# Patient Record
Sex: MALE | Race: WHITE | NOT HISPANIC OR LATINO
[De-identification: names, ages, dates, MRNs, and addresses within clinical notes are randomized per-mention and may not be internally consistent; named-entity substitution may affect disease eponyms.]

---

## 2017-02-14 ENCOUNTER — MEDICATION RENEWAL (OUTPATIENT)
Age: 67
End: 2017-02-14

## 2017-02-20 ENCOUNTER — MEDICATION RENEWAL (OUTPATIENT)
Age: 67
End: 2017-02-20

## 2017-03-28 ENCOUNTER — NON-APPOINTMENT (OUTPATIENT)
Age: 67
End: 2017-03-28

## 2017-03-28 ENCOUNTER — APPOINTMENT (OUTPATIENT)
Dept: CARDIOLOGY | Facility: CLINIC | Age: 67
End: 2017-03-28

## 2017-03-28 VITALS — HEART RATE: 61 BPM | DIASTOLIC BLOOD PRESSURE: 77 MMHG | OXYGEN SATURATION: 98 % | SYSTOLIC BLOOD PRESSURE: 127 MMHG

## 2017-03-28 DIAGNOSIS — I25.119 ATHEROSCLEROTIC HEART DISEASE OF NATIVE CORONARY ARTERY WITH UNSPECIFIED ANGINA PECTORIS: ICD-10-CM

## 2017-04-13 LAB
BASOPHILS # BLD AUTO: 0.02 K/UL
BASOPHILS NFR BLD AUTO: 0.4 %
EOSINOPHIL # BLD AUTO: 0.08 K/UL
EOSINOPHIL NFR BLD AUTO: 1.5 %
HCT VFR BLD CALC: 37.4 %
HGB BLD-MCNC: 13.1 G/DL
IMM GRANULOCYTES NFR BLD AUTO: 0 %
LYMPHOCYTES # BLD AUTO: 1.6 K/UL
LYMPHOCYTES NFR BLD AUTO: 30.5 %
MAN DIFF?: NORMAL
MCHC RBC-ENTMCNC: 32.9 PG
MCHC RBC-ENTMCNC: 35 GM/DL
MCV RBC AUTO: 94 FL
MONOCYTES # BLD AUTO: 0.48 K/UL
MONOCYTES NFR BLD AUTO: 9.1 %
NEUTROPHILS # BLD AUTO: 3.07 K/UL
NEUTROPHILS NFR BLD AUTO: 58.5 %
PLATELET # BLD AUTO: 142 K/UL
RBC # BLD: 3.98 M/UL
RBC # FLD: 13.3 %
WBC # FLD AUTO: 5.25 K/UL

## 2017-04-14 LAB
ALBUMIN SERPL ELPH-MCNC: 4 G/DL
ALP BLD-CCNC: 40 U/L
ALT SERPL-CCNC: 23 U/L
ANION GAP SERPL CALC-SCNC: 13 MMOL/L
AST SERPL-CCNC: 32 U/L
BILIRUB SERPL-MCNC: 2.8 MG/DL
BUN SERPL-MCNC: 23 MG/DL
CALCIUM SERPL-MCNC: 9.4 MG/DL
CHLORIDE SERPL-SCNC: 105 MMOL/L
CHOLEST SERPL-MCNC: 119 MG/DL
CHOLEST/HDLC SERPL: 2.3 RATIO
CO2 SERPL-SCNC: 24 MMOL/L
CREAT SERPL-MCNC: 0.92 MG/DL
GLUCOSE SERPL-MCNC: 102 MG/DL
HDLC SERPL-MCNC: 51 MG/DL
LDLC SERPL CALC-MCNC: 58 MG/DL
POTASSIUM SERPL-SCNC: 4.5 MMOL/L
PROT SERPL-MCNC: 6.4 G/DL
SODIUM SERPL-SCNC: 142 MMOL/L
TRIGL SERPL-MCNC: 50 MG/DL

## 2018-01-15 ENCOUNTER — APPOINTMENT (OUTPATIENT)
Dept: CARDIOLOGY | Facility: CLINIC | Age: 68
End: 2018-01-15
Payer: MEDICARE

## 2018-01-15 PROCEDURE — 93306 TTE W/DOPPLER COMPLETE: CPT

## 2018-02-20 ENCOUNTER — MEDICATION RENEWAL (OUTPATIENT)
Age: 68
End: 2018-02-20

## 2018-02-20 ENCOUNTER — APPOINTMENT (OUTPATIENT)
Dept: CARDIOLOGY | Facility: CLINIC | Age: 68
End: 2018-02-20
Payer: MEDICARE

## 2018-02-20 PROCEDURE — 93015 CV STRESS TEST SUPVJ I&R: CPT

## 2018-02-26 ENCOUNTER — APPOINTMENT (OUTPATIENT)
Dept: CARDIOLOGY | Facility: CLINIC | Age: 68
End: 2018-02-26
Payer: MEDICARE

## 2018-02-26 ENCOUNTER — NON-APPOINTMENT (OUTPATIENT)
Age: 68
End: 2018-02-26

## 2018-02-26 VITALS
HEIGHT: 68 IN | SYSTOLIC BLOOD PRESSURE: 132 MMHG | OXYGEN SATURATION: 99 % | HEART RATE: 57 BPM | BODY MASS INDEX: 25.31 KG/M2 | WEIGHT: 167 LBS | DIASTOLIC BLOOD PRESSURE: 82 MMHG

## 2018-02-26 PROCEDURE — 93000 ELECTROCARDIOGRAM COMPLETE: CPT

## 2018-02-26 PROCEDURE — 99215 OFFICE O/P EST HI 40 MIN: CPT

## 2018-02-27 LAB
ALBUMIN SERPL ELPH-MCNC: 4.3 G/DL
ALP BLD-CCNC: 43 U/L
ALT SERPL-CCNC: 20 U/L
ANION GAP SERPL CALC-SCNC: 15 MMOL/L
AST SERPL-CCNC: 28 U/L
BASOPHILS # BLD AUTO: 0.04 K/UL
BASOPHILS NFR BLD AUTO: 0.6 %
BILIRUB SERPL-MCNC: 2.9 MG/DL
BUN SERPL-MCNC: 19 MG/DL
CALCIUM SERPL-MCNC: 9.8 MG/DL
CHLORIDE SERPL-SCNC: 103 MMOL/L
CHOLEST SERPL-MCNC: 133 MG/DL
CHOLEST/HDLC SERPL: 2.3 RATIO
CO2 SERPL-SCNC: 24 MMOL/L
CREAT SERPL-MCNC: 1.06 MG/DL
EOSINOPHIL # BLD AUTO: 0.07 K/UL
EOSINOPHIL NFR BLD AUTO: 1.1 %
GLUCOSE SERPL-MCNC: 96 MG/DL
HCT VFR BLD CALC: 41 %
HDLC SERPL-MCNC: 59 MG/DL
HGB BLD-MCNC: 14.1 G/DL
IMM GRANULOCYTES NFR BLD AUTO: 0.2 %
LDLC SERPL CALC-MCNC: 63 MG/DL
LYMPHOCYTES # BLD AUTO: 1.98 K/UL
LYMPHOCYTES NFR BLD AUTO: 30.7 %
MAN DIFF?: NORMAL
MCHC RBC-ENTMCNC: 32.6 PG
MCHC RBC-ENTMCNC: 34.4 GM/DL
MCV RBC AUTO: 94.9 FL
MONOCYTES # BLD AUTO: 0.55 K/UL
MONOCYTES NFR BLD AUTO: 8.5 %
NEUTROPHILS # BLD AUTO: 3.79 K/UL
NEUTROPHILS NFR BLD AUTO: 58.9 %
PLATELET # BLD AUTO: 184 K/UL
POTASSIUM SERPL-SCNC: 4.7 MMOL/L
PROT SERPL-MCNC: 7.1 G/DL
RBC # BLD: 4.32 M/UL
RBC # FLD: 14.1 %
SODIUM SERPL-SCNC: 142 MMOL/L
TRIGL SERPL-MCNC: 55 MG/DL
WBC # FLD AUTO: 6.44 K/UL

## 2018-03-21 ENCOUNTER — RX RENEWAL (OUTPATIENT)
Age: 68
End: 2018-03-21

## 2018-04-30 ENCOUNTER — RX RENEWAL (OUTPATIENT)
Age: 68
End: 2018-04-30

## 2018-08-14 ENCOUNTER — MEDICATION RENEWAL (OUTPATIENT)
Age: 68
End: 2018-08-14

## 2019-01-22 ENCOUNTER — NON-APPOINTMENT (OUTPATIENT)
Age: 69
End: 2019-01-22

## 2019-01-22 ENCOUNTER — APPOINTMENT (OUTPATIENT)
Dept: CARDIOLOGY | Facility: CLINIC | Age: 69
End: 2019-01-22
Payer: MEDICARE

## 2019-01-22 VITALS
SYSTOLIC BLOOD PRESSURE: 116 MMHG | OXYGEN SATURATION: 100 % | WEIGHT: 162 LBS | HEIGHT: 68 IN | HEART RATE: 65 BPM | BODY MASS INDEX: 24.55 KG/M2 | DIASTOLIC BLOOD PRESSURE: 71 MMHG

## 2019-01-22 PROCEDURE — 93000 ELECTROCARDIOGRAM COMPLETE: CPT

## 2019-01-22 PROCEDURE — 99215 OFFICE O/P EST HI 40 MIN: CPT

## 2019-01-22 NOTE — HISTORY OF PRESENT ILLNESS
[FreeTextEntry1] : Trent Gage presented to the office for a follow-up cardiovascular evaluation.  He was last seen in the office 1 year ago.\par \par He is now 68 years old with a long history of hypertension, without a history of diabetes and hypercholesterolemia. He presented in March, 2012 with intermittent discomfort in his left shoulder, which did not sound ischemic, noting that it was unrelated to physical activity or emotional stress.  For this, he had nuclear stress testing, which was abnormal. He was referred for a coronary CT, which he had 4/11/2012.  This revealed double vessel coronary artery disease, with hemodynamically significant disease likely.  He was felt  to have a  ~60 stenosis of the proximal LAD and a ~70% stenosis of the circumflex. We discussed the fact that he had a relatively low risk stress test without any symptoms, and that coronary angiography was optional but not required.  \par \par  Eventually, he reported symptoms which could have been considered anginal, and catheterization was performed.  This revealed 70% stenosis of the mid LAD, with generally small distal vessels, and significant branch disease. Medical therapy was felt to be appropriate.\par \par When Trent was last seen in the office he was feeling well.  Stress testing was performed which was abnormal in terms of electrocardiographic changes, but he had no symptoms with a heart rate in the 120s.\par \par Fortunately, he continues to feel well.  He reports no chest discomfort that would be suggestive of angina.  He reports no orthopnea, PND or lower extremity edema. He denies palpitations, as well as dizziness and syncope.  He has been more active, with an exercise bicycle and a weight training routine.   He has not been checking his blood pressure. His cholesterol was last checked about a year ago. He is planned for cataract surgery.

## 2019-01-22 NOTE — CARDIOLOGY SUMMARY
[No Symptoms] : no Symptoms [LVEF ___%] : LVEF [unfilled]% [None] : no pulmonary hypertension [Normal] : normal LA size [Mild] : mild mitral regurgitation [___] : [unfilled]

## 2019-01-22 NOTE — PHYSICAL EXAM
[General Appearance - Well Developed] : well developed [Normal Appearance] : normal appearance [Well Groomed] : well groomed [General Appearance - Well Nourished] : well nourished [No Deformities] : no deformities [General Appearance - In No Acute Distress] : no acute distress [Normal Conjunctiva] : the conjunctiva exhibited no abnormalities [Eyelids - No Xanthelasma] : the eyelids demonstrated no xanthelasmas [Normal Oral Mucosa] : normal oral mucosa [No Oral Pallor] : no oral pallor [No Oral Cyanosis] : no oral cyanosis [Normal Jugular Venous A Waves Present] : normal jugular venous A waves present [Normal Jugular Venous V Waves Present] : normal jugular venous V waves present [No Jugular Venous Cosme A Waves] : no jugular venous cosme A waves [Respiration, Rhythm And Depth] : normal respiratory rhythm and effort [Exaggerated Use Of Accessory Muscles For Inspiration] : no accessory muscle use [Auscultation Breath Sounds / Voice Sounds] : lungs were clear to auscultation bilaterally [Abdomen Soft] : soft [Abdomen Tenderness] : non-tender [Abdomen Mass (___ Cm)] : no abdominal mass palpated [Abnormal Walk] : normal gait [Gait - Sufficient For Exercise Testing] : the gait was sufficient for exercise testing [Nail Clubbing] : no clubbing of the fingernails [Cyanosis, Localized] : no localized cyanosis [Petechial Hemorrhages (___cm)] : no petechial hemorrhages [Skin Color & Pigmentation] : normal skin color and pigmentation [] : no rash [No Venous Stasis] : no venous stasis [Skin Lesions] : no skin lesions [No Skin Ulcers] : no skin ulcer [No Xanthoma] : no  xanthoma was observed [Oriented To Time, Place, And Person] : oriented to person, place, and time [Affect] : the affect was normal [Mood] : the mood was normal [No Anxiety] : not feeling anxious [5th Left ICS - MCL] : palpated at the 5th LICS in the midclavicular line [Normal] : normal [No Precordial Heave] : no precordial heave was noted [Apical Thrill] : no thrill palpable at the apex [Normal Rate] : normal [Rhythm Regular] : regular [Normal S1] : normal S1 [Normal S2] : normal S2 [No Gallop] : no gallop heard [S3] : no S3 [S4] : no S4 [Click] : no click [Pericardial Rub] : no pericardial rub [No Murmur] : no murmurs heard [Right Carotid Bruit] : no bruit heard over the right carotid [Left Carotid Bruit] : no bruit heard over the left carotid [Right Femoral Bruit] : no bruit heard over the right femoral artery [Left Femoral Bruit] : no bruit heard over the left femoral artery [2+] : left 2+ [Bruit] : no bruit heard [No Pitting Edema] : no pitting edema present [Rt] : no varicose veins of the right leg [Lt] : no varicose veins of the left leg

## 2019-01-22 NOTE — REASON FOR VISIT
[Follow-Up - Clinic] : a clinic follow-up of [Abnormal Test Result] : an abnormal test result [Coronary Artery Disease] : coronary artery disease [Hyperlipidemia] : hyperlipidemia [Hypertension] : hypertension

## 2019-01-23 LAB
ALBUMIN SERPL ELPH-MCNC: 4 G/DL
ALP BLD-CCNC: 49 U/L
ALT SERPL-CCNC: 42 U/L
ANION GAP SERPL CALC-SCNC: 6 MMOL/L
APPEARANCE: CLEAR
AST SERPL-CCNC: 45 U/L
BACTERIA: NEGATIVE
BASOPHILS # BLD AUTO: 0.03 K/UL
BASOPHILS NFR BLD AUTO: 0.6 %
BILIRUB SERPL-MCNC: 3.2 MG/DL
BILIRUBIN URINE: NEGATIVE
BLOOD URINE: NEGATIVE
BUN SERPL-MCNC: 21 MG/DL
CALCIUM SERPL-MCNC: 9.6 MG/DL
CHLORIDE SERPL-SCNC: 104 MMOL/L
CHOLEST SERPL-MCNC: 111 MG/DL
CHOLEST/HDLC SERPL: 2.7 RATIO
CO2 SERPL-SCNC: 28 MMOL/L
COLOR: ABNORMAL
CREAT SERPL-MCNC: 1.02 MG/DL
EOSINOPHIL # BLD AUTO: 0.13 K/UL
EOSINOPHIL NFR BLD AUTO: 2.6 %
GLUCOSE QUALITATIVE U: NEGATIVE MG/DL
GLUCOSE SERPL-MCNC: 94 MG/DL
HBA1C MFR BLD HPLC: 5 %
HCT VFR BLD CALC: 37.8 %
HDLC SERPL-MCNC: 41 MG/DL
HGB BLD-MCNC: 12.6 G/DL
HYALINE CASTS: 2 /LPF
IMM GRANULOCYTES NFR BLD AUTO: 0.2 %
KETONES URINE: ABNORMAL
LDLC SERPL CALC-MCNC: 59 MG/DL
LEUKOCYTE ESTERASE URINE: NEGATIVE
LYMPHOCYTES # BLD AUTO: 1.37 K/UL
LYMPHOCYTES NFR BLD AUTO: 26.9 %
MAN DIFF?: NORMAL
MCHC RBC-ENTMCNC: 31.8 PG
MCHC RBC-ENTMCNC: 33.3 GM/DL
MCV RBC AUTO: 95.5 FL
MICROSCOPIC-UA: NORMAL
MONOCYTES # BLD AUTO: 0.59 K/UL
MONOCYTES NFR BLD AUTO: 11.6 %
NEUTROPHILS # BLD AUTO: 2.96 K/UL
NEUTROPHILS NFR BLD AUTO: 58.1 %
NITRITE URINE: NEGATIVE
PH URINE: 6.5
PLATELET # BLD AUTO: 179 K/UL
POTASSIUM SERPL-SCNC: 4.6 MMOL/L
PROT SERPL-MCNC: 6.6 G/DL
PROTEIN URINE: NEGATIVE MG/DL
PSA SERPL-MCNC: 2.08 NG/ML
RBC # BLD: 3.96 M/UL
RBC # FLD: 14.3 %
RED BLOOD CELLS URINE: 1 /HPF
SODIUM SERPL-SCNC: 138 MMOL/L
SPECIFIC GRAVITY URINE: 1.03
SQUAMOUS EPITHELIAL CELLS: 1 /HPF
TRIGL SERPL-MCNC: 55 MG/DL
TSH SERPL-ACNC: 0.73 UIU/ML
UROBILINOGEN URINE: 1 MG/DL
WBC # FLD AUTO: 5.09 K/UL
WHITE BLOOD CELLS URINE: 0 /HPF

## 2019-03-14 ENCOUNTER — APPOINTMENT (OUTPATIENT)
Dept: CARDIOLOGY | Facility: CLINIC | Age: 69
End: 2019-03-14
Payer: MEDICARE

## 2019-03-14 PROCEDURE — 93880 EXTRACRANIAL BILAT STUDY: CPT

## 2019-03-25 ENCOUNTER — RX RENEWAL (OUTPATIENT)
Age: 69
End: 2019-03-25

## 2019-03-27 ENCOUNTER — APPOINTMENT (OUTPATIENT)
Dept: CARDIOLOGY | Facility: CLINIC | Age: 69
End: 2019-03-27
Payer: MEDICARE

## 2019-03-27 PROCEDURE — 93015 CV STRESS TEST SUPVJ I&R: CPT

## 2019-04-10 ENCOUNTER — NON-APPOINTMENT (OUTPATIENT)
Age: 69
End: 2019-04-10

## 2019-04-10 ENCOUNTER — APPOINTMENT (OUTPATIENT)
Dept: CARDIOLOGY | Facility: CLINIC | Age: 69
End: 2019-04-10
Payer: MEDICARE

## 2019-04-10 VITALS
BODY MASS INDEX: 25.01 KG/M2 | HEART RATE: 61 BPM | HEIGHT: 68 IN | WEIGHT: 165 LBS | OXYGEN SATURATION: 97 % | SYSTOLIC BLOOD PRESSURE: 147 MMHG | DIASTOLIC BLOOD PRESSURE: 79 MMHG

## 2019-04-10 VITALS — SYSTOLIC BLOOD PRESSURE: 135 MMHG | DIASTOLIC BLOOD PRESSURE: 80 MMHG

## 2019-04-10 PROCEDURE — 99215 OFFICE O/P EST HI 40 MIN: CPT

## 2019-04-10 PROCEDURE — 93000 ELECTROCARDIOGRAM COMPLETE: CPT

## 2019-04-10 NOTE — HISTORY OF PRESENT ILLNESS
[FreeTextEntry1] : Trent Gage presented to the office for a follow-up cardiovascular evaluation.  He was last seen in the office in January.\par \par He is now 68 years old with a long history of hypertension, without a history of diabetes and hypercholesterolemia. He presented in March, 2012 with intermittent discomfort in his left shoulder, which did not sound ischemic, noting that it was unrelated to physical activity or emotional stress.  For this, he had nuclear stress testing, which was abnormal. He was referred for a coronary CT, which he had 4/11/2012.  This revealed double vessel coronary artery disease, with hemodynamically significant disease likely.  He was felt  to have a  ~60 stenosis of the proximal LAD and a ~70% stenosis of the circumflex. We discussed the fact that he had a relatively low risk stress test without any symptoms, and that coronary angiography was optional but not required.  \par \par  Eventually, he reported symptoms which could have been considered anginal, and catheterization was performed.  This revealed 70% stenosis of the mid LAD, with generally small distal vessels, and significant branch disease. Medical therapy was felt to be appropriate.\par \par At the time of the last visit, he was feeling well. I recommended that we repeat his treadmill stress test to reevaluate his EKG changes and overall response to exercise.\par \par His examination was recently performed. His exercise capacity was reduced relative to last year, and he reported chest discomfort which he did not really report last year. The magnitude of his EKG changes and was essentially unchanged.\par \par He continues to report that he has no angina in his day-to-day activities. He denies any shortness of breath with activity. He denies orthopnea, PND and lower extremity edema. He denies palpitations, dizziness and syncope. He does not check his blood pressure very frequently, but when he does check it, he has found that it has been more elevated.

## 2019-04-10 NOTE — PHYSICAL EXAM
[General Appearance - Well Developed] : well developed [Normal Appearance] : normal appearance [Well Groomed] : well groomed [General Appearance - Well Nourished] : well nourished [No Deformities] : no deformities [General Appearance - In No Acute Distress] : no acute distress [Normal Conjunctiva] : the conjunctiva exhibited no abnormalities [Eyelids - No Xanthelasma] : the eyelids demonstrated no xanthelasmas [Normal Oral Mucosa] : normal oral mucosa [No Oral Pallor] : no oral pallor [No Oral Cyanosis] : no oral cyanosis [Normal Jugular Venous A Waves Present] : normal jugular venous A waves present [Normal Jugular Venous V Waves Present] : normal jugular venous V waves present [No Jugular Venous Cosme A Waves] : no jugular venous cosme A waves [Respiration, Rhythm And Depth] : normal respiratory rhythm and effort [Exaggerated Use Of Accessory Muscles For Inspiration] : no accessory muscle use [Auscultation Breath Sounds / Voice Sounds] : lungs were clear to auscultation bilaterally [Abdomen Soft] : soft [Abdomen Tenderness] : non-tender [Abdomen Mass (___ Cm)] : no abdominal mass palpated [Abnormal Walk] : normal gait [Gait - Sufficient For Exercise Testing] : the gait was sufficient for exercise testing [Nail Clubbing] : no clubbing of the fingernails [Cyanosis, Localized] : no localized cyanosis [Petechial Hemorrhages (___cm)] : no petechial hemorrhages [Skin Color & Pigmentation] : normal skin color and pigmentation [] : no rash [No Venous Stasis] : no venous stasis [Skin Lesions] : no skin lesions [No Skin Ulcers] : no skin ulcer [Oriented To Time, Place, And Person] : oriented to person, place, and time [No Xanthoma] : no  xanthoma was observed [No Anxiety] : not feeling anxious [Mood] : the mood was normal [Affect] : the affect was normal [Normal] : normal [5th Left ICS - MCL] : palpated at the 5th LICS in the midclavicular line [Apical Thrill] : no thrill palpable at the apex [No Precordial Heave] : no precordial heave was noted [Normal Rate] : normal [Rhythm Regular] : regular [Normal S1] : normal S1 [Normal S2] : normal S2 [No Gallop] : no gallop heard [S3] : no S3 [S4] : no S4 [Click] : no click [No Murmur] : no murmurs heard [Pericardial Rub] : no pericardial rub [Right Carotid Bruit] : no bruit heard over the right carotid [Left Carotid Bruit] : no bruit heard over the left carotid [Left Femoral Bruit] : no bruit heard over the left femoral artery [Right Femoral Bruit] : no bruit heard over the right femoral artery [2+] : right 2+ [Bruit] : no bruit heard [No Pitting Edema] : no pitting edema present [Rt] : no varicose veins of the right leg [Lt] : no varicose veins of the left leg

## 2019-04-10 NOTE — DISCUSSION/SUMMARY
[FreeTextEntry1] : Trent remains physically active, and has no symptoms which could be considered angina.  He does have CAD however.  The worst of his disease is branch disease not readily amenable to PCI, based on cath from 2013.  \par \par I am certainly concerned about the fact that he had more symptoms at a lower heart rate on his most recent stress test. In the absence of angina, and if left ventricular function is intact, I think he remains best served with medical therapy alone. He'll have another echocardiogram to reevaluate his LV function in this context. I will be in contact with him to discuss the results.\par \par He is planned for cataract surgery. He may proceed without further cardiac testing. Routine hemodynamic monitoring is recommended.\par \par His blood pressure has been a bit more elevated, but he does not check it much. He will check it more frequently over the next month, and call me if it is elevated.

## 2019-04-29 ENCOUNTER — RX RENEWAL (OUTPATIENT)
Age: 69
End: 2019-04-29

## 2019-08-19 ENCOUNTER — RX RENEWAL (OUTPATIENT)
Age: 69
End: 2019-08-19

## 2019-10-31 ENCOUNTER — APPOINTMENT (OUTPATIENT)
Dept: CARDIOLOGY | Facility: CLINIC | Age: 69
End: 2019-10-31
Payer: MEDICARE

## 2019-10-31 ENCOUNTER — NON-APPOINTMENT (OUTPATIENT)
Age: 69
End: 2019-10-31

## 2019-10-31 VITALS
OXYGEN SATURATION: 99 % | BODY MASS INDEX: 24.86 KG/M2 | HEIGHT: 68 IN | HEART RATE: 70 BPM | DIASTOLIC BLOOD PRESSURE: 74 MMHG | SYSTOLIC BLOOD PRESSURE: 120 MMHG | WEIGHT: 164 LBS

## 2019-10-31 PROCEDURE — 99214 OFFICE O/P EST MOD 30 MIN: CPT

## 2019-10-31 PROCEDURE — 93000 ELECTROCARDIOGRAM COMPLETE: CPT

## 2019-10-31 NOTE — HISTORY OF PRESENT ILLNESS
[FreeTextEntry1] : Trent Gage presented to the office for a follow-up cardiovascular evaluation.  He was last seen in the office in April.\par \par He is now 68 years old with a long history of hypertension, without a history of diabetes and hypercholesterolemia. He presented in March, 2012 with intermittent discomfort in his left shoulder, which did not sound ischemic, noting that it was unrelated to physical activity or emotional stress.  For this, he had nuclear stress testing, which was abnormal. He was referred for a coronary CT, which he had 4/11/2012.  This revealed double vessel coronary artery disease, with hemodynamically significant disease likely.  He was felt  to have a  ~60 stenosis of the proximal LAD and a ~70% stenosis of the circumflex. We discussed the fact that he had a relatively low risk stress test without any symptoms, and that coronary angiography was optional but not required.  \par \par  Eventually, he reported symptoms which could have been considered anginal, and catheterization was performed.  This revealed 70% stenosis of the mid LAD, with generally small distal vessels, and significant branch disease. 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 He denies any shortness of breath with activity. He denies orthopnea, PND and lower extremity edema. He denies palpitations, dizziness and syncope. He does not check his blood pressure very frequently, but when he does check it, he has found that it has been normal.

## 2019-10-31 NOTE — DISCUSSION/SUMMARY
[FreeTextEntry1] : Trent remains physically active, and has no symptoms which could be considered angina.  He does have CAD however.  The worst of his disease is branch disease not readily amenable to PCI, based on cath from 2013.  \par \par I think he remains best served with medical therapy alone. He will have labs done today. He will continue his regular physical activities. If he develops any symptoms, he will call me. Otherwise, he will see me in 6 months. I would consider another echocardiogram and stress test at that time.

## 2019-11-04 LAB
ALBUMIN SERPL ELPH-MCNC: 4.2 G/DL
ALP BLD-CCNC: 47 U/L
ALT SERPL-CCNC: 20 U/L
ANION GAP SERPL CALC-SCNC: 10 MMOL/L
AST SERPL-CCNC: 27 U/L
BASOPHILS # BLD AUTO: 0.04 K/UL
BASOPHILS NFR BLD AUTO: 0.7 %
BILIRUB SERPL-MCNC: 2.5 MG/DL
BUN SERPL-MCNC: 17 MG/DL
CALCIUM SERPL-MCNC: 9.6 MG/DL
CHLORIDE SERPL-SCNC: 104 MMOL/L
CHOLEST SERPL-MCNC: 124 MG/DL
CHOLEST/HDLC SERPL: 2.2 RATIO
CO2 SERPL-SCNC: 26 MMOL/L
CREAT SERPL-MCNC: 1.07 MG/DL
EOSINOPHIL # BLD AUTO: 0.09 K/UL
EOSINOPHIL NFR BLD AUTO: 1.6 %
ESTIMATED AVERAGE GLUCOSE: 91 MG/DL
GLUCOSE SERPL-MCNC: 110 MG/DL
HBA1C MFR BLD HPLC: 4.8 %
HCT VFR BLD CALC: 36.1 %
HDLC SERPL-MCNC: 57 MG/DL
HGB BLD-MCNC: 12.4 G/DL
IMM GRANULOCYTES NFR BLD AUTO: 0.4 %
LDLC SERPL CALC-MCNC: 58 MG/DL
LYMPHOCYTES # BLD AUTO: 1.4 K/UL
LYMPHOCYTES NFR BLD AUTO: 24.6 %
MAN DIFF?: NORMAL
MCHC RBC-ENTMCNC: 32.4 PG
MCHC RBC-ENTMCNC: 34.3 GM/DL
MCV RBC AUTO: 94.3 FL
MONOCYTES # BLD AUTO: 0.54 K/UL
MONOCYTES NFR BLD AUTO: 9.5 %
NEUTROPHILS # BLD AUTO: 3.59 K/UL
NEUTROPHILS NFR BLD AUTO: 63.2 %
PLATELET # BLD AUTO: 185 K/UL
POTASSIUM SERPL-SCNC: 4.5 MMOL/L
PROT SERPL-MCNC: 6.2 G/DL
RBC # BLD: 3.83 M/UL
RBC # FLD: 13 %
SODIUM SERPL-SCNC: 140 MMOL/L
TRIGL SERPL-MCNC: 43 MG/DL
TSH SERPL-ACNC: 0.98 UIU/ML
WBC # FLD AUTO: 5.68 K/UL

## 2020-04-30 ENCOUNTER — RX RENEWAL (OUTPATIENT)
Age: 70
End: 2020-04-30

## 2020-08-31 ENCOUNTER — RX RENEWAL (OUTPATIENT)
Age: 70
End: 2020-08-31

## 2020-10-30 ENCOUNTER — APPOINTMENT (OUTPATIENT)
Dept: CARDIOLOGY | Facility: CLINIC | Age: 70
End: 2020-10-30
Payer: MEDICARE

## 2020-10-30 ENCOUNTER — NON-APPOINTMENT (OUTPATIENT)
Age: 70
End: 2020-10-30

## 2020-10-30 VITALS — DIASTOLIC BLOOD PRESSURE: 80 MMHG | SYSTOLIC BLOOD PRESSURE: 120 MMHG

## 2020-10-30 VITALS
WEIGHT: 159 LBS | SYSTOLIC BLOOD PRESSURE: 137 MMHG | DIASTOLIC BLOOD PRESSURE: 85 MMHG | OXYGEN SATURATION: 100 % | HEIGHT: 68 IN | HEART RATE: 64 BPM | BODY MASS INDEX: 24.1 KG/M2

## 2020-10-30 PROCEDURE — 93000 ELECTROCARDIOGRAM COMPLETE: CPT

## 2020-10-30 PROCEDURE — 99214 OFFICE O/P EST MOD 30 MIN: CPT

## 2020-10-30 NOTE — PHYSICAL EXAM
[General Appearance - Well Developed] : well developed [Normal Appearance] : normal appearance [Well Groomed] : well groomed [General Appearance - Well Nourished] : well nourished [No Deformities] : no deformities [General Appearance - In No Acute Distress] : no acute distress [Normal Conjunctiva] : the conjunctiva exhibited no abnormalities [Eyelids - No Xanthelasma] : the eyelids demonstrated no xanthelasmas [Normal Oral Mucosa] : normal oral mucosa [No Oral Pallor] : no oral pallor [No Oral Cyanosis] : no oral cyanosis [Normal Jugular Venous A Waves Present] : normal jugular venous A waves present [Normal Jugular Venous V Waves Present] : normal jugular venous V waves present [No Jugular Venous Cosme A Waves] : no jugular venous cosme A waves [Respiration, Rhythm And Depth] : normal respiratory rhythm and effort [Exaggerated Use Of Accessory Muscles For Inspiration] : no accessory muscle use [Auscultation Breath Sounds / Voice Sounds] : lungs were clear to auscultation bilaterally [Abdomen Soft] : soft [Abdomen Tenderness] : non-tender [Abdomen Mass (___ Cm)] : no abdominal mass palpated [Abnormal Walk] : normal gait [Gait - Sufficient For Exercise Testing] : the gait was sufficient for exercise testing [Nail Clubbing] : no clubbing of the fingernails [Cyanosis, Localized] : no localized cyanosis [Petechial Hemorrhages (___cm)] : no petechial hemorrhages [Skin Color & Pigmentation] : normal skin color and pigmentation [] : no rash [No Venous Stasis] : no venous stasis [Skin Lesions] : no skin lesions [No Skin Ulcers] : no skin ulcer [No Xanthoma] : no  xanthoma was observed [Oriented To Time, Place, And Person] : oriented to person, place, and time [Affect] : the affect was normal [Mood] : the mood was normal [No Anxiety] : not feeling anxious [5th Left ICS - MCL] : palpated at the 5th LICS in the midclavicular line [Normal] : normal [No Precordial Heave] : no precordial heave was noted [Normal Rate] : normal [Rhythm Regular] : regular [Normal S1] : normal S1 [Normal S2] : normal S2 [No Gallop] : no gallop heard [No Murmur] : no murmurs heard [2+] : left 2+ [No Pitting Edema] : no pitting edema present [Apical Thrill] : no thrill palpable at the apex [S3] : no S3 [S4] : no S4 [Click] : no click [Pericardial Rub] : no pericardial rub [Right Carotid Bruit] : no bruit heard over the right carotid [Left Carotid Bruit] : no bruit heard over the left carotid [Right Femoral Bruit] : no bruit heard over the right femoral artery [Left Femoral Bruit] : no bruit heard over the left femoral artery [Bruit] : no bruit heard [Rt] : no varicose veins of the right leg [Lt] : no varicose veins of the left leg

## 2020-10-30 NOTE — HISTORY OF PRESENT ILLNESS
[FreeTextEntry1] : Trent Gage presented to the office for a follow-up cardiovascular evaluation.  He was last seen in the office 1 year ago.\par \par He is now 69 years old with a long history of hypertension, without a history of diabetes and hypercholesterolemia. He presented in March, 2012 with intermittent discomfort in his left shoulder, which did not sound ischemic, noting that it was unrelated to physical activity or emotional stress.  For this, he had nuclear stress testing, which was abnormal. He was referred for a coronary CT, which he had 4/11/2012.  This revealed double vessel coronary artery disease, with hemodynamically significant disease likely.  He was felt  to have a  ~60 stenosis of the proximal LAD and a ~70% stenosis of the circumflex. We discussed the fact that he had a relatively low risk stress test without any symptoms, and that coronary angiography was optional but not required.  \par \par  Eventually, he reported symptoms which could have been considered anginal, and catheterization was performed.  This revealed 70% stenosis of the mid LAD, with generally small distal vessels, and significant branch disease. 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 He denies any shortness of breath with activity. He denies orthopnea, PND and lower extremity edema. He denies palpitations, dizziness and syncope. He does not check his blood pressure very frequently, but when he does check it, he has found that it has been normal. Because of some significant bruising, he has not been taking his aspirin.

## 2020-10-30 NOTE — DISCUSSION/SUMMARY
[FreeTextEntry1] : Trent remains physically active, and has no symptoms which could be considered angina.  He does have CAD however.  The worst of his disease is branch disease not readily amenable to PCI, based on cath from 2013.  \par \par I think he remains best served with medical therapy alone. He will have labs done today. He will continue his regular physical activities. He will schedule an echo. He will resume aspirin, at least every other day, and as often as he can without significant symptoms. If he develops any symptoms, he will call me. Otherwise, he will see me in 6 months.

## 2020-11-03 LAB
ALBUMIN SERPL ELPH-MCNC: 4.6 G/DL
ALP BLD-CCNC: 56 U/L
ALT SERPL-CCNC: 22 U/L
ANION GAP SERPL CALC-SCNC: 12 MMOL/L
AST SERPL-CCNC: 29 U/L
BASOPHILS # BLD AUTO: 0.04 K/UL
BASOPHILS NFR BLD AUTO: 0.6 %
BILIRUB SERPL-MCNC: 2.8 MG/DL
BUN SERPL-MCNC: 19 MG/DL
CALCIUM SERPL-MCNC: 9.7 MG/DL
CHLORIDE SERPL-SCNC: 102 MMOL/L
CHOLEST SERPL-MCNC: 138 MG/DL
CO2 SERPL-SCNC: 28 MMOL/L
CREAT SERPL-MCNC: 1.02 MG/DL
EOSINOPHIL # BLD AUTO: 0.08 K/UL
EOSINOPHIL NFR BLD AUTO: 1.3 %
ESTIMATED AVERAGE GLUCOSE: 103 MG/DL
GLUCOSE SERPL-MCNC: 104 MG/DL
HBA1C MFR BLD HPLC: 5.2 %
HCT VFR BLD CALC: 42.4 %
HDLC SERPL-MCNC: 55 MG/DL
HGB BLD-MCNC: 13.9 G/DL
IMM GRANULOCYTES NFR BLD AUTO: 0.3 %
LDLC SERPL CALC-MCNC: 72 MG/DL
LYMPHOCYTES # BLD AUTO: 1.62 K/UL
LYMPHOCYTES NFR BLD AUTO: 26.3 %
MAN DIFF?: NORMAL
MCHC RBC-ENTMCNC: 30.8 PG
MCHC RBC-ENTMCNC: 32.8 GM/DL
MCV RBC AUTO: 93.8 FL
MONOCYTES # BLD AUTO: 0.55 K/UL
MONOCYTES NFR BLD AUTO: 8.9 %
NEUTROPHILS # BLD AUTO: 3.85 K/UL
NEUTROPHILS NFR BLD AUTO: 62.6 %
NONHDLC SERPL-MCNC: 82 MG/DL
PLATELET # BLD AUTO: 196 K/UL
POTASSIUM SERPL-SCNC: 4.2 MMOL/L
PROT SERPL-MCNC: 6.6 G/DL
RBC # BLD: 4.52 M/UL
RBC # FLD: 13.4 %
SODIUM SERPL-SCNC: 142 MMOL/L
TRIGL SERPL-MCNC: 55 MG/DL
TSH SERPL-ACNC: 0.91 UIU/ML
WBC # FLD AUTO: 6.16 K/UL

## 2020-11-13 ENCOUNTER — TRANSCRIPTION ENCOUNTER (OUTPATIENT)
Age: 70
End: 2020-11-13

## 2021-04-12 ENCOUNTER — RX RENEWAL (OUTPATIENT)
Age: 71
End: 2021-04-12

## 2021-05-12 ENCOUNTER — RX RENEWAL (OUTPATIENT)
Age: 71
End: 2021-05-12

## 2021-09-13 ENCOUNTER — RX RENEWAL (OUTPATIENT)
Age: 71
End: 2021-09-13

## 2021-10-15 ENCOUNTER — APPOINTMENT (OUTPATIENT)
Dept: CARDIOLOGY | Facility: CLINIC | Age: 71
End: 2021-10-15
Payer: MEDICARE

## 2021-10-15 ENCOUNTER — NON-APPOINTMENT (OUTPATIENT)
Age: 71
End: 2021-10-15

## 2021-10-15 VITALS
OXYGEN SATURATION: 98 % | HEART RATE: 64 BPM | WEIGHT: 155 LBS | SYSTOLIC BLOOD PRESSURE: 126 MMHG | HEIGHT: 68 IN | DIASTOLIC BLOOD PRESSURE: 73 MMHG | BODY MASS INDEX: 23.49 KG/M2

## 2021-10-15 PROCEDURE — 93000 ELECTROCARDIOGRAM COMPLETE: CPT

## 2021-10-15 PROCEDURE — 99214 OFFICE O/P EST MOD 30 MIN: CPT

## 2021-10-15 NOTE — HISTORY OF PRESENT ILLNESS
[FreeTextEntry1] : Trent Gage presented to the office for a follow-up cardiovascular evaluation.  He was last seen in the office 1 year ago.\par \par He is now 70 years old with a long history of hypertension, without a history of diabetes and hypercholesterolemia. He presented in March, 2012 with intermittent discomfort in his left shoulder, which did not sound ischemic, noting that it was unrelated to physical activity or emotional stress.  For this, he had nuclear stress testing, which was abnormal. He was referred for a coronary CT, which he had 4/11/2012.  This revealed double vessel coronary artery disease, with hemodynamically significant disease likely.  He was felt  to have a  ~60 stenosis of the proximal LAD and a ~70% stenosis of the circumflex. We discussed the fact that he had a relatively low risk stress test without any symptoms, and that coronary angiography was optional but not required.  \par \par  Eventually, he reported symptoms which could have been considered anginal, and catheterization was performed.  This revealed 70% stenosis of the mid LAD, with generally small distal vessels, and significant branch disease. 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  His exercise now is mostly walking.  He denies any shortness of breath with activity. He denies orthopnea, PND and lower extremity edema. He denies palpitations, dizziness and syncope. He does not check his blood pressure.  His last blood work was done here 1 year ago.  He was supposed to get an echocardiogram then, but never scheduled it.

## 2021-10-15 NOTE — DISCUSSION/SUMMARY
[FreeTextEntry1] : Trent remains physically active, and has no symptoms which could be considered angina.  He does have CAD however.  The worst of his disease is branch disease not readily amenable to PCI, based on cath from 2013.  \par \par I think he remains best served with medical therapy alone. He will have labs done today. He will continue his regular physical activities. He will schedule an echo and a carotid.  He will continue all of his medications.  If all is well, he can see me in a year.  He will call me if things change, and I will certainly see him sooner.

## 2021-10-15 NOTE — PHYSICAL EXAM
[General Appearance - Well Developed] : well developed [Normal Appearance] : normal appearance [Well Groomed] : well groomed [General Appearance - Well Nourished] : well nourished [No Deformities] : no deformities [General Appearance - In No Acute Distress] : no acute distress [Normal Conjunctiva] : the conjunctiva exhibited no abnormalities [Eyelids - No Xanthelasma] : the eyelids demonstrated no xanthelasmas [Normal Oral Mucosa] : normal oral mucosa [No Oral Pallor] : no oral pallor [No Oral Cyanosis] : no oral cyanosis [Normal Jugular Venous A Waves Present] : normal jugular venous A waves present [Normal Jugular Venous V Waves Present] : normal jugular venous V waves present [No Jugular Venous Cosme A Waves] : no jugular venous cosme A waves [Exaggerated Use Of Accessory Muscles For Inspiration] : no accessory muscle use [Respiration, Rhythm And Depth] : normal respiratory rhythm and effort [Auscultation Breath Sounds / Voice Sounds] : lungs were clear to auscultation bilaterally [Abdomen Soft] : soft [Abdomen Tenderness] : non-tender [Abdomen Mass (___ Cm)] : no abdominal mass palpated [Abnormal Walk] : normal gait [Gait - Sufficient For Exercise Testing] : the gait was sufficient for exercise testing [Nail Clubbing] : no clubbing of the fingernails [Petechial Hemorrhages (___cm)] : no petechial hemorrhages [Cyanosis, Localized] : no localized cyanosis [Skin Color & Pigmentation] : normal skin color and pigmentation [] : no rash [No Venous Stasis] : no venous stasis [Skin Lesions] : no skin lesions [No Skin Ulcers] : no skin ulcer [No Xanthoma] : no  xanthoma was observed [Oriented To Time, Place, And Person] : oriented to person, place, and time [Mood] : the mood was normal [Affect] : the affect was normal [No Anxiety] : not feeling anxious [5th Left ICS - MCL] : palpated at the 5th LICS in the midclavicular line [No Precordial Heave] : no precordial heave was noted [Normal] : normal [Normal Rate] : normal [Rhythm Regular] : regular [Normal S1] : normal S1 [Normal S2] : normal S2 [No Gallop] : no gallop heard [No Murmur] : no murmurs heard [2+] : left 2+ [No Pitting Edema] : no pitting edema present [Apical Thrill] : no thrill palpable at the apex [S3] : no S3 [S4] : no S4 [Click] : no click [Pericardial Rub] : no pericardial rub [Right Carotid Bruit] : no bruit heard over the right carotid [Left Carotid Bruit] : no bruit heard over the left carotid [Right Femoral Bruit] : no bruit heard over the right femoral artery [Left Femoral Bruit] : no bruit heard over the left femoral artery [Bruit] : no bruit heard [Rt] : no varicose veins of the right leg [Lt] : no varicose veins of the left leg

## 2021-10-18 LAB
25(OH)D3 SERPL-MCNC: 51.4 NG/ML
ALBUMIN SERPL ELPH-MCNC: 4.4 G/DL
ALP BLD-CCNC: 58 U/L
ALT SERPL-CCNC: 33 U/L
ANION GAP SERPL CALC-SCNC: 13 MMOL/L
AST SERPL-CCNC: 37 U/L
BASOPHILS # BLD AUTO: 0.04 K/UL
BASOPHILS NFR BLD AUTO: 0.7 %
BILIRUB SERPL-MCNC: 2.6 MG/DL
BUN SERPL-MCNC: 22 MG/DL
CALCIUM SERPL-MCNC: 9.6 MG/DL
CHLORIDE SERPL-SCNC: 104 MMOL/L
CHOLEST SERPL-MCNC: 140 MG/DL
CO2 SERPL-SCNC: 25 MMOL/L
CREAT SERPL-MCNC: 0.98 MG/DL
EOSINOPHIL # BLD AUTO: 0.13 K/UL
EOSINOPHIL NFR BLD AUTO: 2.2 %
ESTIMATED AVERAGE GLUCOSE: 100 MG/DL
GLUCOSE SERPL-MCNC: 107 MG/DL
HBA1C MFR BLD HPLC: 5.1 %
HCT VFR BLD CALC: 39.5 %
HDLC SERPL-MCNC: 55 MG/DL
HGB BLD-MCNC: 13.5 G/DL
IMM GRANULOCYTES NFR BLD AUTO: 0.3 %
LDLC SERPL CALC-MCNC: 75 MG/DL
LYMPHOCYTES # BLD AUTO: 1.55 K/UL
LYMPHOCYTES NFR BLD AUTO: 25.7 %
MAN DIFF?: NORMAL
MCHC RBC-ENTMCNC: 31.9 PG
MCHC RBC-ENTMCNC: 34.2 GM/DL
MCV RBC AUTO: 93.4 FL
MONOCYTES # BLD AUTO: 0.59 K/UL
MONOCYTES NFR BLD AUTO: 9.8 %
NEUTROPHILS # BLD AUTO: 3.69 K/UL
NEUTROPHILS NFR BLD AUTO: 61.3 %
NONHDLC SERPL-MCNC: 85 MG/DL
PLATELET # BLD AUTO: 162 K/UL
POTASSIUM SERPL-SCNC: 4.7 MMOL/L
PROT SERPL-MCNC: 6.5 G/DL
RBC # BLD: 4.23 M/UL
RBC # FLD: 14 %
SODIUM SERPL-SCNC: 142 MMOL/L
TRIGL SERPL-MCNC: 51 MG/DL
TSH SERPL-ACNC: 0.88 UIU/ML
WBC # FLD AUTO: 6.02 K/UL

## 2021-10-28 ENCOUNTER — APPOINTMENT (OUTPATIENT)
Dept: CARDIOLOGY | Facility: CLINIC | Age: 71
End: 2021-10-28
Payer: MEDICARE

## 2021-10-28 PROCEDURE — 93306 TTE W/DOPPLER COMPLETE: CPT

## 2021-10-28 PROCEDURE — 93880 EXTRACRANIAL BILAT STUDY: CPT

## 2021-12-01 ENCOUNTER — RX RENEWAL (OUTPATIENT)
Age: 71
End: 2021-12-01

## 2022-01-05 ENCOUNTER — APPOINTMENT (OUTPATIENT)
Dept: NEUROLOGY | Facility: CLINIC | Age: 72
End: 2022-01-05
Payer: MEDICARE

## 2022-01-05 VITALS
HEIGHT: 68 IN | BODY MASS INDEX: 24.25 KG/M2 | WEIGHT: 160 LBS | SYSTOLIC BLOOD PRESSURE: 133 MMHG | DIASTOLIC BLOOD PRESSURE: 76 MMHG | HEART RATE: 76 BPM

## 2022-01-05 DIAGNOSIS — Z85.828 PERSONAL HISTORY OF OTHER MALIGNANT NEOPLASM OF SKIN: ICD-10-CM

## 2022-01-05 DIAGNOSIS — R45.4 IRRITABILITY AND ANGER: ICD-10-CM

## 2022-01-05 PROCEDURE — 96116 NUBHVL XM PHYS/QHP 1ST HR: CPT | Mod: 59

## 2022-01-05 PROCEDURE — 99205 OFFICE O/P NEW HI 60 MIN: CPT | Mod: 25

## 2022-01-05 RX ORDER — LOSARTAN POTASSIUM 50 MG/1
50 TABLET, FILM COATED ORAL DAILY
Qty: 30 | Refills: 3 | Status: COMPLETED | COMMUNITY
Start: 2018-07-19 | End: 2022-01-05

## 2022-01-06 ENCOUNTER — TRANSCRIPTION ENCOUNTER (OUTPATIENT)
Age: 72
End: 2022-01-06

## 2022-01-06 LAB
FOLATE SERPL-MCNC: 16.4 NG/ML
T PALLIDUM AB SER QL IA: NEGATIVE
VIT B12 SERPL-MCNC: 292 PG/ML

## 2022-01-10 LAB — VIT B1 SERPL-MCNC: 109 NMOL/L

## 2022-01-13 ENCOUNTER — NON-APPOINTMENT (OUTPATIENT)
Age: 72
End: 2022-01-13

## 2022-01-13 ENCOUNTER — OUTPATIENT (OUTPATIENT)
Dept: OUTPATIENT SERVICES | Facility: HOSPITAL | Age: 72
LOS: 1 days | End: 2022-01-13
Payer: MEDICARE

## 2022-01-13 ENCOUNTER — APPOINTMENT (OUTPATIENT)
Dept: MRI IMAGING | Facility: CLINIC | Age: 72
End: 2022-01-13
Payer: MEDICARE

## 2022-01-13 DIAGNOSIS — G31.84 MILD COGNITIVE IMPAIRMENT OF UNCERTAIN OR UNKNOWN ETIOLOGY: ICD-10-CM

## 2022-01-13 PROCEDURE — 70551 MRI BRAIN STEM W/O DYE: CPT | Mod: 26,ME

## 2022-01-13 PROCEDURE — G1004: CPT

## 2022-01-13 PROCEDURE — 70551 MRI BRAIN STEM W/O DYE: CPT | Mod: ME

## 2022-03-09 ENCOUNTER — APPOINTMENT (OUTPATIENT)
Dept: NEUROLOGY | Facility: CLINIC | Age: 72
End: 2022-03-09
Payer: MEDICARE

## 2022-03-09 PROCEDURE — 95806 SLEEP STUDY UNATT&RESP EFFT: CPT

## 2022-03-11 ENCOUNTER — NON-APPOINTMENT (OUTPATIENT)
Age: 72
End: 2022-03-11

## 2022-04-13 ENCOUNTER — APPOINTMENT (OUTPATIENT)
Dept: PULMONOLOGY | Facility: CLINIC | Age: 72
End: 2022-04-13
Payer: MEDICARE

## 2022-04-13 ENCOUNTER — APPOINTMENT (OUTPATIENT)
Dept: PULMONOLOGY | Facility: CLINIC | Age: 72
End: 2022-04-13

## 2022-04-13 VITALS
SYSTOLIC BLOOD PRESSURE: 160 MMHG | HEART RATE: 67 BPM | TEMPERATURE: 97 F | HEIGHT: 68 IN | OXYGEN SATURATION: 97 % | RESPIRATION RATE: 15 BRPM | WEIGHT: 162 LBS | DIASTOLIC BLOOD PRESSURE: 87 MMHG | BODY MASS INDEX: 24.55 KG/M2

## 2022-04-13 PROCEDURE — 99202 OFFICE O/P NEW SF 15 MIN: CPT

## 2022-04-13 NOTE — REASON FOR VISIT
[Sleep Apnea] : sleep apnea [Initial Eval - Existing Diagnosis] : an initial evaluation of an existing diagnosis

## 2022-04-13 NOTE — ASSESSMENT
[FreeTextEntry1] :  71 year old OTYA GUNDERSON with CAD, HTN, mild cognitive impairment, was referred by neurologist for management of recently diagnosed mild sleep apnea (AHI 8) predominantly central, on HST. \par \par The patient has symptoms of sleep-disordered breathing including fragmented sleep and snoring.   Patient admits to difficulty with concentration and short-term memory for 1-year, finds it "frustrating".  He dozes off unintentionally only while inactive. Reportedly sleeping 6-8 hours without daytime sleep or insomnia.  \par \par The ramifications of sleep apnea and its potential therapeutic modalities were discussed with the patient. The patient was referred to the [facility] for a titration sleep study.  He will follow up by phone 2-weeks after the sleep study if he has not been contacted by then, to initiate therapy.\par \par Intake forms were left with patient for completion at end of visit.\par \par \par \par

## 2022-04-13 NOTE — REVIEW OF SYSTEMS
[Fatigue] : no fatigue [Nasal Congestion] : no nasal congestion [Postnasal Drip] : no postnasal drip [Shortness Of Breath] : no shortness of breath [Chest Pain] : no chest pain [Palpitations] : no palpitations [Edema] : ~T edema was not present [Obesity] : not obese [Thyroid Disease] : no thyroid disease [Diabetes] : no diabetes  [Anemia] : no anemia [History of Iron Deficiency] : no history of iron deficiency [Heartburn] : no heartburn [Nocturia] : no nocturia [Arthralgias] : no arthralgias [Depression] : no depression [Anxious] : not anxious [FreeTextEntry9] : EF 60% on 1/15/18 Echo

## 2022-04-13 NOTE — HISTORY OF PRESENT ILLNESS
[Frequent Nocturnal Awakening] : frequent nocturnal awakening [Unintentional Sleep While Inactive] : unintentional sleep while inactive [To Bed: ___] : ~he/she~ goes to bed at [unfilled] [Arises: ___] : arises at [unfilled] [Sleep Onset Latency: ___ minutes] : sleep onset latency of [unfilled] minutes reported [Nocturnal Awakenings: ___] : ~he/she~ typically has [unfilled] nocturnal awakenings [WASO: ___] : Wake time after sleep onset is [unfilled] [TST: ___] : Total sleep time is [unfilled] [Daytime Sleep: ___] : daytime sleep: [unfilled] [Obstructive Sleep Apnea] : obstructive sleep apnea [Date: ___] : Date of most recent diagnostic polysomnogram: [unfilled] [AHI: ___ per hour] : Apnea-hypopnea index:  [unfilled] per hour [T90%: ___] : T90%: [unfilled]% [Evert desatuation%: ___] : Evert desaturation:  [unfilled]% [FreeTextEntry1] : 71 year old male TOYA GUNDERSON was referred by neurologist Dr. Ludmila Larson for management of with recently diagnosed Mild sleep apnea on HST with mild cognitive impairment and snoring.  \par ·	PMH:  CAD, HTN, 2018 EF 60%. \par \par MILD sleep apnea (AHI 8) predominantly central, on 3/9/2022 HST.  KRISTOPHER 5.9.   0:30 hr.  OAI 1.0\par \par Patient is without sleep complaints:  "wake up fresh, wake up 3-4 times (for unclear reasons).  Reason for visit:  "I was told to". He admits to difficulty with concentration and short-term memory for 1-year, finds it "frustrating".  He denies irritability.  He dozes off unintentionally only while inactive. Reportedly sleeping 6-8 hours without daytime sleep or insomnia.  \par \par He denies comorbidities.  Reviewed allergies and medication.  On medications because "I was told to".  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  \par SOCIAL HX:  Never smoked.  Occasional alcohol.  Denies drug use history.  1-2 cups coffee daily as late as early afternoon. \par  [Snoring] : no snoring [Witnessed Apneas] : no witnessed sleep apnea [Unintentional Sleep while Active] : no unintentional sleep while active [Awakes Unrefreshed] : does not awake unrefreshed [Awakes with Headache] : no headache upon awakening [Awakening With Dry Mouth] : no dry mouth upon awakening [Recent  Weight Gain] : no recent weight gain [Daytime Somnolence] : no daytime somnolence [DIS] : no DIS [DMS] : no DMS [Unusual Sleep Behavior] : no unusual sleep behavior [Lower Extremity Discomfort] : no lower extremity discomfort in evening or at bedtime

## 2022-04-13 NOTE — PHYSICAL EXAM
[Low Lying Soft Palate] : low lying soft palate [Elongated Uvula] : elongated uvula [Enlarged Base of the Tongue] : enlargement of the base of the tongue [IV] : IV [Normal Appearance] : normal appearance [Well Groomed] : well groomed [General Appearance - In No Acute Distress] : no acute distress [Normal Conjunctiva] : the conjunctiva exhibited no abnormalities [Neck Appearance] : the appearance of the neck was normal [Heart Rate And Rhythm] : heart rate was normal and rhythm regular [Murmurs] : no murmurs [] : no respiratory distress [Auscultation Breath Sounds / Voice Sounds] : lungs were clear to auscultation bilaterally [Involuntary Movements] : no involuntary movements were seen [No Focal Deficits] : no focal deficits [Affect] : the affect was normal [Mood] : the mood was normal [FreeTextEntry2] : no edema

## 2022-04-28 ENCOUNTER — APPOINTMENT (OUTPATIENT)
Dept: NEUROLOGY | Facility: CLINIC | Age: 72
End: 2022-04-28
Payer: MEDICARE

## 2022-04-28 VITALS
HEART RATE: 68 BPM | DIASTOLIC BLOOD PRESSURE: 76 MMHG | SYSTOLIC BLOOD PRESSURE: 133 MMHG | HEIGHT: 68 IN | BODY MASS INDEX: 24.55 KG/M2 | WEIGHT: 162 LBS

## 2022-04-28 DIAGNOSIS — G47.31 PRIMARY CENTRAL SLEEP APNEA: ICD-10-CM

## 2022-04-28 PROCEDURE — 99215 OFFICE O/P EST HI 40 MIN: CPT

## 2022-05-06 ENCOUNTER — RX RENEWAL (OUTPATIENT)
Age: 72
End: 2022-05-06

## 2022-06-22 ENCOUNTER — FORM ENCOUNTER (OUTPATIENT)
Age: 72
End: 2022-06-22

## 2022-06-23 ENCOUNTER — OUTPATIENT (OUTPATIENT)
Dept: OUTPATIENT SERVICES | Facility: HOSPITAL | Age: 72
LOS: 1 days | End: 2022-06-23
Payer: MEDICARE

## 2022-06-23 ENCOUNTER — APPOINTMENT (OUTPATIENT)
Dept: SLEEP CENTER | Facility: CLINIC | Age: 72
End: 2022-06-23

## 2022-06-23 PROCEDURE — 95811 POLYSOM 6/>YRS CPAP 4/> PARM: CPT

## 2022-06-23 PROCEDURE — 95811 POLYSOM 6/>YRS CPAP 4/> PARM: CPT | Mod: 26

## 2022-07-01 ENCOUNTER — NON-APPOINTMENT (OUTPATIENT)
Age: 72
End: 2022-07-01

## 2022-07-05 ENCOUNTER — NON-APPOINTMENT (OUTPATIENT)
Age: 72
End: 2022-07-05

## 2022-07-06 ENCOUNTER — TRANSCRIPTION ENCOUNTER (OUTPATIENT)
Age: 72
End: 2022-07-06

## 2022-08-08 DIAGNOSIS — G47.33 OBSTRUCTIVE SLEEP APNEA (ADULT) (PEDIATRIC): ICD-10-CM

## 2022-08-31 ENCOUNTER — APPOINTMENT (OUTPATIENT)
Dept: SLEEP CENTER | Facility: CLINIC | Age: 72
End: 2022-08-31

## 2022-09-07 ENCOUNTER — RX RENEWAL (OUTPATIENT)
Age: 72
End: 2022-09-07

## 2022-10-28 ENCOUNTER — APPOINTMENT (OUTPATIENT)
Dept: CARDIOLOGY | Facility: CLINIC | Age: 72
End: 2022-10-28

## 2022-10-28 ENCOUNTER — NON-APPOINTMENT (OUTPATIENT)
Age: 72
End: 2022-10-28

## 2022-10-28 VITALS
HEART RATE: 64 BPM | SYSTOLIC BLOOD PRESSURE: 121 MMHG | DIASTOLIC BLOOD PRESSURE: 65 MMHG | BODY MASS INDEX: 23.19 KG/M2 | HEIGHT: 68 IN | OXYGEN SATURATION: 99 % | WEIGHT: 153 LBS

## 2022-10-28 PROCEDURE — 99214 OFFICE O/P EST MOD 30 MIN: CPT

## 2022-10-28 PROCEDURE — 93000 ELECTROCARDIOGRAM COMPLETE: CPT

## 2022-10-28 NOTE — HISTORY OF PRESENT ILLNESS
[FreeTextEntry1] : Trent Gage presented to the office for a follow-up cardiovascular evaluation.  He was last seen in the office 1 year ago.\par \par He is now 71 years old with a long history of hypertension, without a history of diabetes and hypercholesterolemia. He presented in March, 2012 with intermittent discomfort in his left shoulder, which did not sound ischemic, noting that it was unrelated to physical activity or emotional stress.  For this, he had nuclear stress testing, which was abnormal. He was referred for a coronary CT, which he had 4/11/2012.  This revealed double vessel coronary artery disease, with hemodynamically significant disease likely.  He was felt  to have a  ~60 stenosis of the proximal LAD and a ~70% stenosis of the circumflex. We discussed the fact that he had a relatively low risk stress test without any symptoms, and that coronary angiography was optional but not required.  \par \par  Eventually, he reported symptoms which could have been considered anginal, and catheterization was performed.  This revealed 70% stenosis of the mid LAD, with generally small distal vessels, and significant branch disease. 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  He denies any shortness of breath with activity. He denies orthopnea, PND and lower extremity edema. He denies palpitations, dizziness and syncope. He does not check his blood pressure.  His last blood work was done here 1 year ago. He has had equivocal results on sleep study, but does not want to repeat it again.  He has been evaluated for mild dementia.

## 2022-10-31 LAB
ALBUMIN SERPL ELPH-MCNC: 4.3 G/DL
ALP BLD-CCNC: 55 U/L
ALT SERPL-CCNC: 28 U/L
ANION GAP SERPL CALC-SCNC: 12 MMOL/L
AST SERPL-CCNC: 29 U/L
BASOPHILS # BLD AUTO: 0.04 K/UL
BASOPHILS NFR BLD AUTO: 0.7 %
BILIRUB SERPL-MCNC: 2.7 MG/DL
BUN SERPL-MCNC: 21 MG/DL
CALCIUM SERPL-MCNC: 9.6 MG/DL
CHLORIDE SERPL-SCNC: 103 MMOL/L
CHOLEST SERPL-MCNC: 155 MG/DL
CO2 SERPL-SCNC: 26 MMOL/L
CREAT SERPL-MCNC: 1.03 MG/DL
EGFR: 77 ML/MIN/1.73M2
EOSINOPHIL # BLD AUTO: 0.06 K/UL
EOSINOPHIL NFR BLD AUTO: 1 %
ESTIMATED AVERAGE GLUCOSE: 100 MG/DL
GLUCOSE SERPL-MCNC: 103 MG/DL
HBA1C MFR BLD HPLC: 5.1 %
HCT VFR BLD CALC: 37.8 %
HDLC SERPL-MCNC: 55 MG/DL
HGB BLD-MCNC: 13.1 G/DL
IMM GRANULOCYTES NFR BLD AUTO: 0.2 %
LDLC SERPL CALC-MCNC: 89 MG/DL
LYMPHOCYTES # BLD AUTO: 1.64 K/UL
LYMPHOCYTES NFR BLD AUTO: 27 %
MAN DIFF?: NORMAL
MCHC RBC-ENTMCNC: 32.3 PG
MCHC RBC-ENTMCNC: 34.7 GM/DL
MCV RBC AUTO: 93.1 FL
MONOCYTES # BLD AUTO: 0.53 K/UL
MONOCYTES NFR BLD AUTO: 8.7 %
NEUTROPHILS # BLD AUTO: 3.8 K/UL
NEUTROPHILS NFR BLD AUTO: 62.4 %
NONHDLC SERPL-MCNC: 100 MG/DL
PLATELET # BLD AUTO: 173 K/UL
POTASSIUM SERPL-SCNC: 4.2 MMOL/L
PROT SERPL-MCNC: 6.5 G/DL
RBC # BLD: 4.06 M/UL
RBC # FLD: 13.7 %
SODIUM SERPL-SCNC: 141 MMOL/L
TRIGL SERPL-MCNC: 55 MG/DL
TSH SERPL-ACNC: 0.93 UIU/ML
WBC # FLD AUTO: 6.08 K/UL

## 2022-11-03 ENCOUNTER — APPOINTMENT (OUTPATIENT)
Dept: CARDIOLOGY | Facility: CLINIC | Age: 72
End: 2022-11-03

## 2022-11-03 PROCEDURE — 93880 EXTRACRANIAL BILAT STUDY: CPT

## 2022-11-03 PROCEDURE — 93306 TTE W/DOPPLER COMPLETE: CPT

## 2022-11-15 ENCOUNTER — APPOINTMENT (OUTPATIENT)
Dept: NEUROLOGY | Facility: CLINIC | Age: 72
End: 2022-11-15

## 2022-11-15 VITALS — SYSTOLIC BLOOD PRESSURE: 174 MMHG | HEART RATE: 60 BPM | DIASTOLIC BLOOD PRESSURE: 89 MMHG

## 2022-11-15 PROCEDURE — 99215 OFFICE O/P EST HI 40 MIN: CPT

## 2022-11-15 NOTE — HISTORY OF PRESENT ILLNESS
[FreeTextEntry1] : NO COVID.\par COVID VACCINE FULL.\par \par HPI: Trent Gage is a 72 R handed male who is accompanies by his daughter Jennifer today. He presents today for a follow up visit. He was last seen by Dr. Larson in 2022 for memory problems. He started to have memory problems two years ago. Was having difficult with short term memory. His daughter states that they would have a conversation and wouldn't’ remember it and repeat himself. Since last visit symptoms has not gotten worse.  Denies paranoia, delusions, hallucinations. \par \par Initially had a full work up with neuropsychologist Dr. Dariela Ruiz in 2021 who diagnosed him with mild cognitive impairment. A colleague of the daughter knew her at the same while the patient was having these memory issues and thought this was the person to see initially. \par \par PMH: HTN, HLD THOMAS?\par \par Had a home and inpatient sleep study in 2022 that showed sleep apnea. The  There was conflicting results with the home sleep study.  \par \par -Memory:  short term memory impaired in regards to conversations, also repeating questions. Long term memory there is no issue\par -Speech:no issues \par -Orientation: no issues \par -Praxis: no issues \par -Decision making/Executive fx/Multitasking: ok\par \par -Sleep: sleeps well throughout the night, some awakening, no actual gasping noted; \par \par -Appetite: no issues \par \par -Motor symptoms: no issues \par \par -B/B: no issues \par \par -Psychiatric symptoms:mood is content\par \par -Functional status:\par Quevedo Index of [place] in Activities of Daily Living:\par 1. Bathing/Showerin\par 2. Dressin\par 3. Toiletin\par 4. Transferrin\par 5. Continence: 1\par 6: Feedin\par \par TOTAL:  6\par \par [place]-Jhoan Instrumental Activities of Daily Living:\par A. Ability to Use Telephone: 1\par B. Shoppin\par C. Food Preparation: 1\par D. Housekeepin\par E. Laundry: 1\par F. Transportation: 1\par G. 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 Denies tobacco use. Socially drinks. Lives alone \par \par PCP and other physicians:\par -PCP: Dr. Patrick King\par -Neuropsychologist:Dr. Dariela Ruiz\par -Cardiologist: Dr. Christie\par \par Workup done: MRI brain in 2022, \par Neuropscyh testing in 2021.

## 2022-11-15 NOTE — PHYSICAL EXAM
[General Appearance - Alert] : alert [General Appearance - In No Acute Distress] : in no acute distress [Oriented To Time, Place, And Person] : oriented to person, place, and time [Impaired Insight] : insight and judgment were intact [Affect] : the affect was normal [Person] : oriented to person [Place] : oriented to place [Time] : oriented to time [Remote Intact] : remote memory intact [Registration Intact] : recent registration memory intact [Concentration Intact] : normal concentrating ability [Visual Intact] : visual attention was ~T not ~L decreased [Naming Objects] : no difficulty naming common objects [Repeating Phrases] : no difficulty repeating a phrase [Writing A Sentence] : no difficulty writing a sentence [Fluency] : fluency intact [Comprehension] : comprehension intact [Reading] : reading intact [Past History] : adequate knowledge of personal past history [Vocabulary] : adequate range of vocabulary [Total Score ___ / 30] : the patient achieved a score of [unfilled] /30 [Date / Time ___ / 5] : date / time [unfilled] / 5 [Place ___ / 5] : place [unfilled] / 5 [Registration ___ / 3] : registration [unfilled] / 3 [Serial Sevens ___/5] : serial sevens [unfilled] / 5 [Naming 2 Objects ___ / 2] : naming two objects [unfilled] / 2 [Repeating a Sentence ___ / 1] : repeating a sentence [unfilled] / 1 [Writing a Sentence ___ / 1] : write sentence [unfilled] / 1 [3-stage Verbal Command ___ / 3] : three-stage verbal command [unfilled] / 3 [Written Command ___ / 1] : written command [unfilled] / 1 [Copy a Design ___ / 1] : copy a design [unfilled] / 1 [Recall ___ / 3] : recall [unfilled] / 3 [Cranial Nerves Optic (II)] : visual acuity intact bilaterally,  visual fields full to confrontation, pupils equal round and reactive to light [Cranial Nerves Oculomotor (III)] : extraocular motion intact [Cranial Nerves Trigeminal (V)] : facial sensation intact symmetrically [Cranial Nerves Facial (VII)] : face symmetrical [Cranial Nerves Vestibulocochlear (VIII)] : hearing was intact bilaterally [Cranial Nerves Glossopharyngeal (IX)] : tongue and palate midline [Cranial Nerves Accessory (XI - Cranial And Spinal)] : head turning and shoulder shrug symmetric [Cranial Nerves Hypoglossal (XII)] : there was no tongue deviation with protrusion [Motor Tone] : muscle tone was normal in all four extremities [Motor Strength] : muscle strength was normal in all four extremities [Involuntary Movements] : no involuntary movements were seen [No Muscle Atrophy] : normal bulk in all four extremities [Motor Handedness Right-Handed] : the patient is right hand dominant [Sensation Tactile Decrease] : light touch was intact [Balance] : balance was intact [2+] : Ankle jerk left 2+ [Sclera] : the sclera and conjunctiva were normal [PERRL With Normal Accommodation] : pupils were equal in size, round, reactive to light, with normal accommodation [Extraocular Movements] : extraocular movements were intact [No APD] : no afferent pupillary defect [No JAMAL] : no internuclear ophthalmoplegia [Full Visual Field] : full visual field [Outer Ear] : the ears and nose were normal in appearance [Neck Appearance] : the appearance of the neck was normal [Edema] : there was no peripheral edema [Abnormal Walk] : normal gait [Skin Color & Pigmentation] : normal skin color and pigmentation [] : no rash [Short Term Intact] : short term memory impaired [Span Intact] : the attention span was decreased [Current Events] : inadequate knowledge of current events [Motor Strength Upper Extremities Bilaterally] : strength was normal in both upper extremities [Motor Strength Lower Extremities Bilaterally] : strength was normal in both lower extremities [Limited Balance] : balance was intact [Past-pointing] : there was no past-pointing [Tremor] : no tremor present [Dysdiadochokinesia Bilaterally] : not present [Coordination - Dysmetria Impaired Finger-to-Nose Bilateral] : not present [Coordination - Dysmetria Impaired Heel-to-Shin Bilateral] : not present [Plantar Reflex Right Only] : normal on the right [Plantar Reflex Left Only] : normal on the left [FreeTextEntry4] : Mental Status Exam\par Presidents: 2/5 (older)\par Alternating Pattern: 1\par Spiral: \par Clock: 3/3\par Repetition: ok \par \par R/L discrimination on self and examiner: ok\par Cross-line commands: ok\par Praxis: overall intact. [FreeTextEntry1] : scar from skin CA on forehead

## 2022-11-15 NOTE — ASSESSMENT
[FreeTextEntry1] : Assessment:\par 71yo RH WM with aMCI, seen in the past by Dr. Larson.\par Full ADL and IADL, slow progression, still very independent.\par ? if THOMAS, tests very mild, inconclusive.\par Bradycardia is marginal, and EKGs are benign, QTc ok.\par \par Diagnostic Impression:\par -aMCI\par -cerebrovascular disease\par \par Plan:\par -consider Galantamine\par -no trials due to CVD.\par \par \par A thorough discussion was entertained with the patient/caregiver regarding the use of psychoactive medications, their possible benefits and AE profile, including the risk of cardiovascular complications, including but not limited to applicable black box warning and teratogenicity, where appropriate.\par We discussed the benefits of being active, physically and mentally, and the need to to establish a routine in this respect.\par Driving abilities and firearms possession and use were discussed, in relation to progression of the cognitive decline, and the need to assess them periodically.\par Patient/caregiver advised to bring previous records to this office.\par All questions were answered at the time of the visit. We are certainly available for further discussion as needed.\par Patient/caregiver fully understands and agree with the plan.\par

## 2022-12-05 ENCOUNTER — RX RENEWAL (OUTPATIENT)
Age: 72
End: 2022-12-05

## 2022-12-06 ENCOUNTER — TRANSCRIPTION ENCOUNTER (OUTPATIENT)
Age: 72
End: 2022-12-06

## 2023-03-26 ENCOUNTER — RX RENEWAL (OUTPATIENT)
Age: 73
End: 2023-03-26

## 2023-07-28 ENCOUNTER — TRANSCRIPTION ENCOUNTER (OUTPATIENT)
Age: 73
End: 2023-07-28

## 2023-07-31 ENCOUNTER — TRANSCRIPTION ENCOUNTER (OUTPATIENT)
Age: 73
End: 2023-07-31

## 2023-08-01 ENCOUNTER — APPOINTMENT (OUTPATIENT)
Dept: NEUROLOGY | Facility: CLINIC | Age: 73
End: 2023-08-01
Payer: MEDICARE

## 2023-08-01 VITALS
BODY MASS INDEX: 23.19 KG/M2 | HEART RATE: 74 BPM | HEIGHT: 68 IN | SYSTOLIC BLOOD PRESSURE: 173 MMHG | DIASTOLIC BLOOD PRESSURE: 82 MMHG | WEIGHT: 153 LBS

## 2023-08-01 PROCEDURE — 99214 OFFICE O/P EST MOD 30 MIN: CPT

## 2023-08-01 NOTE — ASSESSMENT
[FreeTextEntry1] : Assessment:  73yo RH WM with vascular MCI.  Mild progression of STM issues, but still quite ok.  ADL and IADL ok.  Would only recommend to be more active, more so socially.  Increase Galantamine to 16mg, no AE for now.      Diagnostic Impression:  -vascular MCI   Plan:  -Increase Galantamine to 16mg  -will consider Lecanemab, but borderline due to CVD  -will refer to amador -monitor BP as it was still high today I recommended to pursue mental and physical activity and to adhere to Mediterranean type of diet.

## 2023-08-01 NOTE — PHYSICAL EXAM
[General Appearance - Alert] : alert [General Appearance - In No Acute Distress] : in no acute distress [Oriented To Time, Place, And Person] : oriented to person, place, and time [Impaired Insight] : insight and judgment were intact [Affect] : the affect was normal [Person] : oriented to person [Place] : oriented to place [Time] : oriented to time [Short Term Intact] : short term memory impaired [Remote Intact] : remote memory intact [Registration Intact] : recent registration memory intact [Span Intact] : the attention span was decreased [Concentration Intact] : normal concentrating ability [Visual Intact] : visual attention was ~T not ~L decreased [Naming Objects] : no difficulty naming common objects [Repeating Phrases] : no difficulty repeating a phrase [Writing A Sentence] : no difficulty writing a sentence [Fluency] : fluency intact [Comprehension] : comprehension intact [Reading] : reading intact [Current Events] : inadequate knowledge of current events [Past History] : adequate knowledge of personal past history [Vocabulary] : adequate range of vocabulary [Total Score ___ / 30] : the patient achieved a score of [unfilled] /30 [Date / Time ___ / 5] : date / time [unfilled] / 5 [Place ___ / 5] : place [unfilled] / 5 [Registration ___ / 3] : registration [unfilled] / 3 [Serial Sevens ___/5] : serial sevens [unfilled] / 5 [Naming 2 Objects ___ / 2] : naming two objects [unfilled] / 2 [Repeating a Sentence ___ / 1] : repeating a sentence [unfilled] / 1 [Writing a Sentence ___ / 1] : write sentence [unfilled] / 1 [3-stage Verbal Command ___ / 3] : three-stage verbal command [unfilled] / 3 [Written Command ___ / 1] : written command [unfilled] / 1 [Copy a Design ___ / 1] : copy a design [unfilled] / 1 [Recall ___ / 3] : recall [unfilled] / 3 [Cranial Nerves Optic (II)] : visual acuity intact bilaterally,  visual fields full to confrontation, pupils equal round and reactive to light [Cranial Nerves Oculomotor (III)] : extraocular motion intact [Cranial Nerves Trigeminal (V)] : facial sensation intact symmetrically [Cranial Nerves Facial (VII)] : face symmetrical [Cranial Nerves Vestibulocochlear (VIII)] : hearing was intact bilaterally [Cranial Nerves Glossopharyngeal (IX)] : tongue and palate midline [Cranial Nerves Accessory (XI - Cranial And Spinal)] : head turning and shoulder shrug symmetric [Cranial Nerves Hypoglossal (XII)] : there was no tongue deviation with protrusion [Motor Tone] : muscle tone was normal in all four extremities [Motor Strength] : muscle strength was normal in all four extremities [Involuntary Movements] : no involuntary movements were seen [No Muscle Atrophy] : normal bulk in all four extremities [Motor Handedness Right-Handed] : the patient is right hand dominant [Motor Strength Upper Extremities Bilaterally] : strength was normal in both upper extremities [Motor Strength Lower Extremities Bilaterally] : strength was normal in both lower extremities [Sensation Tactile Decrease] : light touch was intact [Balance] : balance was intact [Limited Balance] : balance was intact [Past-pointing] : there was no past-pointing [Tremor] : no tremor present [Dysdiadochokinesia Bilaterally] : not present [Coordination - Dysmetria Impaired Finger-to-Nose Bilateral] : not present [Coordination - Dysmetria Impaired Heel-to-Shin Bilateral] : not present [2+] : Ankle jerk left 2+ [Plantar Reflex Right Only] : normal on the right [Plantar Reflex Left Only] : normal on the left [FreeTextEntry4] : Mental Status Exam\par  Presidents: 2/5 (older)\par  Alternating Pattern: 1\par  Spiral: \par  Clock: 3/3\par  Repetition: ok \par  \par  R/L discrimination on self and examiner: ok\par  Cross-line commands: ok\par  Praxis: overall intact. [Sclera] : the sclera and conjunctiva were normal [PERRL With Normal Accommodation] : pupils were equal in size, round, reactive to light, with normal accommodation [Extraocular Movements] : extraocular movements were intact [No APD] : no afferent pupillary defect [No JAMAL] : no internuclear ophthalmoplegia [Full Visual Field] : full visual field [Outer Ear] : the ears and nose were normal in appearance [Neck Appearance] : the appearance of the neck was normal [Edema] : there was no peripheral edema [Abnormal Walk] : normal gait [Skin Color & Pigmentation] : normal skin color and pigmentation [] : no rash [FreeTextEntry1] : scar from skin CA on forehead

## 2023-08-01 NOTE — HISTORY OF PRESENT ILLNESS
[FreeTextEntry1] : NO COVID. COVID VACCINE FULL.  HPI-interval hx [id number]:  Since last seen in 2022, no changes in meds.   Still getting a bit of bruises from the ASA81, but has been like this.  No AE from Galantamien 8mg. Sleep and appetite ok.  No motor changes.   Tends to get more disoriented when outside of his routine and environment.  Rest is stable.    -Sleep: ok, stable   -Appetite: stable, no changes   -Motor symptoms: stable, no changes. Per daughter, he could be doing a bit more exercise.   -B/B: no issues.   -Psychiatric symptoms: stable, no real symptoms; per daughter, he should socialize and exercise a bit more.  ADL and IADL stable.   -Professional status:  , still active.   HPI: Trent Gage is a 72 R handed male who is accompanies by his daughter Jennifer today. He presents today for a follow up visit. He was last seen by Dr. Larson in 2022 for memory problems. He started to have memory problems two years ago. Was having difficult with short term memory. His daughter states that they would have a conversation and wouldn't' remember it and repeat himself. Since last visit symptoms has not gotten worse.  Denies paranoia, delusions, hallucinations.   Initially had a full work up with neuropsychologist Dr. Dariela Ruiz in 2021 who diagnosed him with mild cognitive impairment. A colleague of the daughter knew her at the same while the patient was having these memory issues and thought this was the person to see initially.   PMH: HTN, HLD THOMAS?  Had a home and inpatient sleep study in 2022 that showed sleep apnea. The  There was conflicting results with the home sleep study.    -Memory:  short term memory impaired in regards to conversations, also repeating questions. Long term memory there is no issue -Speech:no issues  -Orientation: no issues  -Praxis: no issues  -Decision making/Executive fx/Multitasking: ok  -Sleep: sleeps well throughout the night, some awakening, no actual gasping noted;   -Appetite: no issues   -Motor symptoms: no issues   -B/B: no issues   -Psychiatric symptoms:mood is content  -Functional status: Quevedo Index of [place] in Activities of Daily Livin. Bathing/Showerin 2. Dressin 3. Toiletin 4. Transferrin 5. Continence: 1 6: Feedin  TOTAL:  6  [place]hoan Instrumental Activities of Daily Living: A. Ability to Use Telephone: 1 B. Shoppin C. Food Preparation: 1 D. Housekeepin E. Laundry: 1 F. Transportation: 1 G. 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 Denies tobacco use. Socially drinks. Lives alone   PCP and other physicians: -PCP: Dr. Patrick King -Neuropsychologist:Dr. Dariela Ruiz -Cardiologist: Dr. Christie  Workup done: MRI brain in 2022,  Neuropscyh testing in 2021.

## 2023-08-22 ENCOUNTER — APPOINTMENT (OUTPATIENT)
Dept: NEUROLOGY | Facility: CLINIC | Age: 73
End: 2023-08-22

## 2023-09-06 ENCOUNTER — NON-APPOINTMENT (OUTPATIENT)
Age: 73
End: 2023-09-06

## 2023-09-18 ENCOUNTER — APPOINTMENT (OUTPATIENT)
Dept: GERIATRICS | Facility: CLINIC | Age: 73
End: 2023-09-18
Payer: MEDICARE

## 2023-09-18 VITALS
TEMPERATURE: 97.5 F | OXYGEN SATURATION: 99 % | SYSTOLIC BLOOD PRESSURE: 167 MMHG | HEART RATE: 58 BPM | WEIGHT: 148.25 LBS | DIASTOLIC BLOOD PRESSURE: 88 MMHG | BODY MASS INDEX: 22.47 KG/M2 | HEIGHT: 68 IN | RESPIRATION RATE: 58 BRPM

## 2023-09-18 DIAGNOSIS — R73.9 HYPERGLYCEMIA, UNSPECIFIED: ICD-10-CM

## 2023-09-18 DIAGNOSIS — Z86.19 PERSONAL HISTORY OF OTHER INFECTIOUS AND PARASITIC DISEASES: ICD-10-CM

## 2023-09-18 DIAGNOSIS — Z13.29 ENCOUNTER FOR SCREENING FOR OTHER SUSPECTED ENDOCRINE DISORDER: ICD-10-CM

## 2023-09-18 DIAGNOSIS — Z23 ENCOUNTER FOR IMMUNIZATION: ICD-10-CM

## 2023-09-18 DIAGNOSIS — Z13.21 ENCOUNTER FOR SCREENING FOR NUTRITIONAL DISORDER: ICD-10-CM

## 2023-09-18 DIAGNOSIS — E55.9 VITAMIN D DEFICIENCY, UNSPECIFIED: ICD-10-CM

## 2023-09-18 DIAGNOSIS — Z86.79 PERSONAL HISTORY OF OTHER DISEASES OF THE CIRCULATORY SYSTEM: ICD-10-CM

## 2023-09-18 DIAGNOSIS — Z81.8 FAMILY HISTORY OF OTHER MENTAL AND BEHAVIORAL DISORDERS: ICD-10-CM

## 2023-09-18 PROCEDURE — 99205 OFFICE O/P NEW HI 60 MIN: CPT | Mod: 25

## 2023-09-18 PROCEDURE — G0009: CPT

## 2023-09-18 PROCEDURE — 90677 PCV20 VACCINE IM: CPT

## 2023-09-20 LAB
25(OH)D3 SERPL-MCNC: 43.9 NG/ML
ALBUMIN SERPL ELPH-MCNC: 4.7 G/DL
ALP BLD-CCNC: 58 U/L
ALT SERPL-CCNC: 19 U/L
ANION GAP SERPL CALC-SCNC: 11 MMOL/L
AST SERPL-CCNC: 25 U/L
BASOPHILS # BLD AUTO: 0.04 K/UL
BASOPHILS NFR BLD AUTO: 0.5 %
BILIRUB SERPL-MCNC: 3.1 MG/DL
BUN SERPL-MCNC: 19 MG/DL
CALCIUM SERPL-MCNC: 10.2 MG/DL
CHLORIDE SERPL-SCNC: 103 MMOL/L
CHOLEST SERPL-MCNC: 146 MG/DL
CO2 SERPL-SCNC: 26 MMOL/L
CREAT SERPL-MCNC: 0.88 MG/DL
EGFR: 91 ML/MIN/1.73M2
EOSINOPHIL # BLD AUTO: 0.07 K/UL
EOSINOPHIL NFR BLD AUTO: 0.9 %
ESTIMATED AVERAGE GLUCOSE: 97 MG/DL
FOLATE SERPL-MCNC: 13.1 NG/ML
GLUCOSE SERPL-MCNC: 81 MG/DL
HBA1C MFR BLD HPLC: 5 %
HCT VFR BLD CALC: 40.2 %
HDLC SERPL-MCNC: 57 MG/DL
HGB BLD-MCNC: 14 G/DL
IMM GRANULOCYTES NFR BLD AUTO: 0.2 %
LDLC SERPL CALC-MCNC: 76 MG/DL
LYMPHOCYTES # BLD AUTO: 1.51 K/UL
LYMPHOCYTES NFR BLD AUTO: 18.3 %
MAN DIFF?: NORMAL
MCHC RBC-ENTMCNC: 32.6 PG
MCHC RBC-ENTMCNC: 34.8 GM/DL
MCV RBC AUTO: 93.5 FL
MONOCYTES # BLD AUTO: 0.6 K/UL
MONOCYTES NFR BLD AUTO: 7.3 %
NEUTROPHILS # BLD AUTO: 5.99 K/UL
NEUTROPHILS NFR BLD AUTO: 72.8 %
NONHDLC SERPL-MCNC: 90 MG/DL
PLATELET # BLD AUTO: 178 K/UL
POTASSIUM SERPL-SCNC: 4.3 MMOL/L
PROT SERPL-MCNC: 6.9 G/DL
RBC # BLD: 4.3 M/UL
RBC # FLD: 13.9 %
SODIUM SERPL-SCNC: 140 MMOL/L
TRIGL SERPL-MCNC: 69 MG/DL
TSH SERPL-ACNC: 0.91 UIU/ML
VIT B12 SERPL-MCNC: 1357 PG/ML
WBC # FLD AUTO: 8.23 K/UL

## 2023-10-04 ENCOUNTER — APPOINTMENT (OUTPATIENT)
Dept: GERIATRICS | Facility: CLINIC | Age: 73
End: 2023-10-04
Payer: MEDICARE

## 2023-10-04 VITALS
SYSTOLIC BLOOD PRESSURE: 147 MMHG | WEIGHT: 146 LBS | OXYGEN SATURATION: 99 % | RESPIRATION RATE: 14 BRPM | BODY MASS INDEX: 22.2 KG/M2 | DIASTOLIC BLOOD PRESSURE: 72 MMHG | TEMPERATURE: 98 F | HEART RATE: 50 BPM

## 2023-10-04 DIAGNOSIS — Z87.898 PERSONAL HISTORY OF OTHER SPECIFIED CONDITIONS: ICD-10-CM

## 2023-10-04 DIAGNOSIS — Z86.79 PERSONAL HISTORY OF OTHER DISEASES OF THE CIRCULATORY SYSTEM: ICD-10-CM

## 2023-10-04 DIAGNOSIS — Z23 ENCOUNTER FOR IMMUNIZATION: ICD-10-CM

## 2023-10-04 DIAGNOSIS — Z00.00 ENCOUNTER FOR GENERAL ADULT MEDICAL EXAMINATION W/OUT ABNORMAL FINDINGS: ICD-10-CM

## 2023-10-04 DIAGNOSIS — Z74.1 NEED FOR ASSISTANCE WITH PERSONAL CARE: ICD-10-CM

## 2023-10-04 PROCEDURE — G0008: CPT | Mod: 59

## 2023-10-04 PROCEDURE — G0439: CPT

## 2023-10-04 PROCEDURE — 90662 IIV NO PRSV INCREASED AG IM: CPT | Mod: 59

## 2023-11-07 ENCOUNTER — RX RENEWAL (OUTPATIENT)
Age: 73
End: 2023-11-07

## 2023-12-11 ENCOUNTER — TRANSCRIPTION ENCOUNTER (OUTPATIENT)
Age: 73
End: 2023-12-11

## 2023-12-11 RX ORDER — GALANTAMINE 16 MG/1
16 CAPSULE, EXTENDED RELEASE ORAL
Qty: 90 | Refills: 3 | Status: ACTIVE | COMMUNITY
Start: 2022-11-16 | End: 1900-01-01

## 2024-01-11 ENCOUNTER — APPOINTMENT (OUTPATIENT)
Dept: GERIATRICS | Facility: CLINIC | Age: 74
End: 2024-01-11
Payer: MEDICARE

## 2024-01-11 VITALS — DIASTOLIC BLOOD PRESSURE: 84 MMHG | SYSTOLIC BLOOD PRESSURE: 147 MMHG

## 2024-01-11 VITALS
HEART RATE: 63 BPM | DIASTOLIC BLOOD PRESSURE: 83 MMHG | SYSTOLIC BLOOD PRESSURE: 155 MMHG | TEMPERATURE: 97.5 F | WEIGHT: 152.5 LBS | BODY MASS INDEX: 23.11 KG/M2 | HEIGHT: 68 IN | OXYGEN SATURATION: 98 %

## 2024-01-11 DIAGNOSIS — R79.89 OTHER SPECIFIED ABNORMAL FINDINGS OF BLOOD CHEMISTRY: ICD-10-CM

## 2024-01-11 PROCEDURE — 99214 OFFICE O/P EST MOD 30 MIN: CPT

## 2024-01-11 NOTE — CURRENT MEDS
[] : missed dose(s) of medications. Reason(s): [Patient/caregiver able to verbalize understanding of medications, including indications and side effects] : Patient/caregiver able to verbalize understanding of medications, including indications and side effects

## 2024-01-16 RX ORDER — LOSARTAN POTASSIUM 50 MG/1
50 TABLET, FILM COATED ORAL DAILY
Qty: 135 | Refills: 3 | Status: ACTIVE | COMMUNITY
Start: 2018-08-01 | End: 1900-01-01

## 2024-01-16 NOTE — PHYSICAL EXAM
[Normal] : normal gait, no clubbing or cyanosis of the fingernails, muscle strength and tone were normal, no involuntary movements were seen [No Focal Deficits] : no focal deficits [Normal Affect] : the affect was normal [Normal Mood] : the mood was normal [MentalStatusTotal] :  26 /30 on 8/1/23 w/ Dr. Soriano [Normal Insight/Judgment] : insight and judgment were not intact [de-identified] : scattered scabs/excoriations/ecchymoses [de-identified] : calm, cooperative

## 2024-01-16 NOTE — REASON FOR VISIT
[Follow-Up] : a follow-up visit [Family Member] : family member [FreeTextEntry1] : HTN, dementia [FreeTextEntry3] :  Daughter Sole  [phone] [FreeTextEntry2] : who assists with history due to patient with baseline cognitive impairment

## 2024-01-16 NOTE — ASSESSMENT
[FreeTextEntry1] : BP elevated today, including on repeat - We agree to inc Losartan to 75mg daily - BP machine script given, to keep BP log at home and bring to next visit - c/w Toprol 25mg daily - caution in setting of bradycardia  Consider stop neuriva to limit pill burden  Would benefit from inc supervision of med intake  Pt/dtr to request medical records from PMD and GI - GI Dr. Pak - pending repeat colonoscopy report from 2023 - dtr will request copy  - last 2 visit notes, labs, EKG, immunization records - To clarify shingrix, Tdap vaccines Had updated covid booster in pharmacy  f/u with pulm for repeat sleep study  Dexa ordered - f/u Optho f/u adv Derm f/u  - Plan for repeat formal cognitive/mood/behavior assessment at next visit ADC ref?  f/u in 3 mo, unless earlier PRN.

## 2024-01-16 NOTE — REVIEW OF SYSTEMS
[As Noted in HPI] : as noted in HPI [Negative] : Heme/Lymph [de-identified] : s/p recent [facility]'s

## 2024-01-16 NOTE — HISTORY OF PRESENT ILLNESS
[No falls in past year] : Patient reported no falls in the past year [Patient is independent with] : bathing [Independent] : managing finances [] : Assistance needed with housekeeping [Full assistance needed] : Assistance needed managing medications [Smoke Detector] : smoke detector [Carbon Monoxide Detector] : carbon monoxide detector [Driving Concerns] : driving concerns noted [Wears Seat Belt] : wears seat belt [0] : 2) Feeling down, depressed, or hopeless: Not at all (0) [PHQ-2 Negative - No further assessment needed] : PHQ-2 Negative - No further assessment needed [Designated Healthcare Proxy] : Designated healthcare proxy [I will adhere to the patient's wishes.] : I will adhere to the patient's wishes. [BreastSonogramDate] : ? [TextBox_25] : Pt recalls having bone density testing with PMD? 5-10yrs ago  [TextBox_31] : Denies problems  [BoneDensityDate] : 2022 [TextBox_37] : Samm Nicole @ [facility] - follows annually s/p cataract sx approx 2019 [ColonoscopyDate] : 2022 [TextBox_43] : due for f/u in 2023  [Mammogramdate] : 2023 [TextBox_19] : GI Dr. Pak - pending repeat colonoscopy report from 2023 - dtr will request copy  [FreeTextEntry1] : 2023 [FreeTextEntry2] : [place] Jamie Lemus[place] [facility]  [Guns at Home] : no guns at home [Grab Bars] : no grab bars [Shower Chair] : no shower chair [Night Light] : no night light [Getupandgo] : <12  [de-identified] : dtr in [place] orders food for delivery  [de-identified] : dtr in [place] orders food for delivery  [de-identified] : cleaning help every other week  [FreeTextEntry6] : deny driving safety concerns  [FreeTextEntry7] : took double dose of meds during med change  [[id number]] : 0 [AdvancecareDate] : 1/11/24 [FreeTextEntry4] : HCP form on file: primary is dtr Flory ANGEL), dtr Gerda is alternate Living will on file - reviewed previously.  GOC discussed w/ pt - stated being bedbound is not acceptable QOL.  QOL is most important.  Not being a burden on family is also important.  Currently would wish to be resuscitated/intubated if chance of recovery to acceptable QOL possible.  If unable to make decisions for himself anymore would not wish to prolong life.

## 2024-01-18 ENCOUNTER — APPOINTMENT (OUTPATIENT)
Dept: RADIOLOGY | Facility: CLINIC | Age: 74
End: 2024-01-18

## 2024-01-22 ENCOUNTER — RX RENEWAL (OUTPATIENT)
Age: 74
End: 2024-01-22

## 2024-01-23 ENCOUNTER — APPOINTMENT (OUTPATIENT)
Dept: RADIOLOGY | Facility: CLINIC | Age: 74
End: 2024-01-23
Payer: MEDICARE

## 2024-01-23 ENCOUNTER — OUTPATIENT (OUTPATIENT)
Dept: OUTPATIENT SERVICES | Facility: HOSPITAL | Age: 74
LOS: 1 days | End: 2024-01-23
Payer: MEDICARE

## 2024-01-23 DIAGNOSIS — Z13.820 ENCOUNTER FOR SCREENING FOR OSTEOPOROSIS: ICD-10-CM

## 2024-01-23 PROCEDURE — 77085 DXA BONE DENSITY AXL VRT FX: CPT

## 2024-01-23 PROCEDURE — 77085 DXA BONE DENSITY AXL VRT FX: CPT | Mod: 26

## 2024-01-25 ENCOUNTER — NON-APPOINTMENT (OUTPATIENT)
Age: 74
End: 2024-01-25

## 2024-01-29 ENCOUNTER — APPOINTMENT (OUTPATIENT)
Dept: PULMONOLOGY | Facility: CLINIC | Age: 74
End: 2024-01-29
Payer: MEDICARE

## 2024-01-29 VITALS
RESPIRATION RATE: 16 BRPM | SYSTOLIC BLOOD PRESSURE: 143 MMHG | WEIGHT: 155 LBS | BODY MASS INDEX: 23.49 KG/M2 | HEIGHT: 68 IN | DIASTOLIC BLOOD PRESSURE: 77 MMHG | TEMPERATURE: 98 F | OXYGEN SATURATION: 96 % | HEART RATE: 65 BPM

## 2024-01-29 PROCEDURE — 99214 OFFICE O/P EST MOD 30 MIN: CPT

## 2024-01-29 NOTE — PHYSICAL EXAM
[Low Lying Soft Palate] : low lying soft palate [III] : Mallampati Class: III [Normal Appearance] : normal appearance [No Neck Mass] : no neck mass [Normal Rate/Rhythm] : normal rate/rhythm [Normal S1, S2] : normal s1, s2 [No Murmurs] : no murmurs [No Resp Distress] : no resp distress [Clear to Auscultation Bilaterally] : clear to auscultation bilaterally [No Clubbing] : no clubbing [No Cyanosis] : no cyanosis [No Edema] : no edema [Normal Color/ Pigmentation] : normal color/ pigmentation [No Focal Deficits] : no focal deficits [Normal Mood] : normal mood [Normal Affect] : normal affect

## 2024-01-29 NOTE — HISTORY OF PRESENT ILLNESS
[Obstructive Sleep Apnea] : obstructive sleep apnea [Never] : never [TextBox_4] : Patient presents for follow-up regarding previously diagnosed mild sleep apnea. The patient reports generally good sleep quality, going to bed between 9 to 10 PM and waking up around 6 to 7 AM, with one awakening during the night not related to bathroom needs. The patient denies experiencing dry mouth, headaches, or snoring upon waking, but mentions occasional heavy breathing. The patient acknowledges a diagnosis of mild cognitive impairment with minimal short-term memory issues but is able to perform day-to-day activities. Previous sleep studies indicated mild sleep apnea, with difficulty breathing noted when using the machine during the second study. The patient has not been treated with a CPAP machine. [Awakes Unrefreshed] : does not awaken unrefreshed [Awakes with Dry Mouth] : does not awaken with dry mouth [Awakes with Headache] : does not awaken with headache [Frequent Nocturnal Awakening] : denies frequent nocturnal awakening [Nonrestorative Sleep] : denies nonrestorative sleep [Snoring] : no snoring [Unintentional Sleep while Active] : no unintentional sleep while active [Witnessed Apneas] : no witnessed apneas [DIS] : does not have difficulty initiating sleep [DMS] : does not have difficulty maintaining sleep [Hypersomnolence] : no hypersomnolence [Cataplexy] : no cataplexy [Sleep Paralysis] : no sleep paralysis [Hypnagogic Hallucinations] : no hypnagogic hallucinations [Lower Extremity Discomfort] : does not have lower extremity discomfort [Irresistible urge to move legs] : does not have irresistible urge to move legs [LE discomfort relieved by movement] : denies lower extremity discomfort relieved by movement [Late day/ Evening symptoms] : does not have late day/ evening symptoms [Sleep Disturbances due to LE symptoms] : denies sleep disturbances due to lower extremity symptoms [TextBox_77] : 9:30am [TextBox_79] : 6-7am [TextBox_81] : 15 [TextBox_83] : 0-1

## 2024-01-29 NOTE — ASSESSMENT
[FreeTextEntry1] : This is a 73 year old male with history of THOMAS and MCI who comes in for a follow up:     Mild Sleep Apnea Plan: 1. Review previous sleep studies indicating very mild sleep apnea and difficulty with CPAP machine settings. 2. Arrange for a repeat home diagnostic sleep study to confirm the mild nature of sleep apnea and determine if treatment is necessary. 3. Educate the patient on the upcoming home sleep study procedure, including the use of a device with a nasal cannula, oxygen sensor, and chest and abdomen belts.  Mild Cognitive Impairment Plan: 1. Patient acknowledges mild short-term memory problems but is able to perform day-to-day activities. 2. No new interventions discussed for cognitive symptoms during this visit.  Follow-up: Patient to follow up in one to two weeks after the home diagnostic sleep study to review results and discuss further management.

## 2024-03-07 ENCOUNTER — APPOINTMENT (OUTPATIENT)
Dept: SLEEP CENTER | Facility: CLINIC | Age: 74
End: 2024-03-07
Payer: MEDICARE

## 2024-03-07 ENCOUNTER — OUTPATIENT (OUTPATIENT)
Dept: OUTPATIENT SERVICES | Facility: HOSPITAL | Age: 74
LOS: 1 days | End: 2024-03-07
Payer: MEDICARE

## 2024-03-07 PROCEDURE — 95800 SLP STDY UNATTENDED: CPT

## 2024-03-07 PROCEDURE — G0400: CPT

## 2024-03-11 ENCOUNTER — RESULT REVIEW (OUTPATIENT)
Age: 74
End: 2024-03-11

## 2024-03-11 ENCOUNTER — OUTPATIENT (OUTPATIENT)
Dept: OUTPATIENT SERVICES | Facility: HOSPITAL | Age: 74
LOS: 1 days | End: 2024-03-11
Payer: SUBSIDIZED

## 2024-03-11 ENCOUNTER — APPOINTMENT (OUTPATIENT)
Dept: MRI IMAGING | Facility: HOSPITAL | Age: 74
End: 2024-03-11

## 2024-03-11 DIAGNOSIS — Z00.6 ENCOUNTER FOR EXAMINATION FOR NORMAL COMPARISON AND CONTROL IN CLINICAL RESEARCH PROGRAM: ICD-10-CM

## 2024-03-11 DIAGNOSIS — Z00.00 ENCOUNTER FOR GENERAL ADULT MEDICAL EXAMINATION WITHOUT ABNORMAL FINDINGS: ICD-10-CM

## 2024-03-11 PROCEDURE — 70551 MRI BRAIN STEM W/O DYE: CPT | Mod: 26

## 2024-03-11 PROCEDURE — 70551 MRI BRAIN STEM W/O DYE: CPT

## 2024-03-13 ENCOUNTER — APPOINTMENT (OUTPATIENT)
Dept: NEUROLOGY | Facility: CLINIC | Age: 74
End: 2024-03-13
Payer: MEDICARE

## 2024-03-13 DIAGNOSIS — G47.33 OBSTRUCTIVE SLEEP APNEA (ADULT) (PEDIATRIC): ICD-10-CM

## 2024-03-13 PROCEDURE — 93880 EXTRACRANIAL BILAT STUDY: CPT

## 2024-03-14 ENCOUNTER — NON-APPOINTMENT (OUTPATIENT)
Age: 74
End: 2024-03-14

## 2024-03-14 ENCOUNTER — APPOINTMENT (OUTPATIENT)
Dept: CARDIOLOGY | Facility: CLINIC | Age: 74
End: 2024-03-14
Payer: MEDICARE

## 2024-03-14 ENCOUNTER — APPOINTMENT (OUTPATIENT)
Dept: NEUROLOGY | Facility: CLINIC | Age: 74
End: 2024-03-14
Payer: MEDICARE

## 2024-03-14 ENCOUNTER — TRANSCRIPTION ENCOUNTER (OUTPATIENT)
Age: 74
End: 2024-03-14

## 2024-03-14 VITALS
OXYGEN SATURATION: 97 % | SYSTOLIC BLOOD PRESSURE: 127 MMHG | DIASTOLIC BLOOD PRESSURE: 69 MMHG | BODY MASS INDEX: 23.49 KG/M2 | HEART RATE: 57 BPM | HEIGHT: 68 IN | WEIGHT: 155 LBS

## 2024-03-14 VITALS
BODY MASS INDEX: 22.73 KG/M2 | HEART RATE: 54 BPM | WEIGHT: 150 LBS | SYSTOLIC BLOOD PRESSURE: 154 MMHG | HEIGHT: 68 IN | DIASTOLIC BLOOD PRESSURE: 80 MMHG

## 2024-03-14 DIAGNOSIS — I25.10 ATHEROSCLEROTIC HEART DISEASE OF NATIVE CORONARY ARTERY W/OUT ANGINA PECTORIS: ICD-10-CM

## 2024-03-14 PROCEDURE — 99205 OFFICE O/P NEW HI 60 MIN: CPT

## 2024-03-14 PROCEDURE — 99213 OFFICE O/P EST LOW 20 MIN: CPT

## 2024-03-14 PROCEDURE — G2211 COMPLEX E/M VISIT ADD ON: CPT

## 2024-03-14 PROCEDURE — 93000 ELECTROCARDIOGRAM COMPLETE: CPT

## 2024-03-14 PROCEDURE — 99214 OFFICE O/P EST MOD 30 MIN: CPT

## 2024-03-14 NOTE — DISCUSSION/SUMMARY
[FreeTextEntry1] : Mr. Gage is a very pleasant 73-year-old man who is here for consultation regarding incidental right temp hyperintensity found on MRI that was performed for the purpose of screening for clinical trial for cognitive impairment. He has had no neurological symptoms.  Ischemic stroke or TIA.  MRI was completed on March 11. I reviewed neuroimaging with the patient and his daughter, there is small diffusion-weighted imaging hyperintensity in the right temporal lobe that could suggest a silent brain infarct.  Differential diagnosis also includes an artifact. He has vascular risk factors of hypertension hyperlipidemia however they are very well-controlled.  From my standpoint he should continue to take aspirin and atorvastatin.  He is seeing his cardiologist Dr. Christie today.  He will benefit with a repeat echocardiogram, previous echocardiogram from 2022 has been within normal limits.  I also spoke to the patient and family regarding short-term/long-term cardiac monitoring with loop recorder to rule out paroxysmal atrial fibrillation.  His carotid Doppler study showed mild less than 40% stenosis of bilateral carotid arteries with heterogeneous plaque in the right he can follow-up with yearly Dopplers and transcranial Doppler at our office.  Will repeat another MRI in about 6 months to ensure that there are no more silent brain infarcts. [Intensive Blood Pressure Control] : intensive blood pressure control [Antithrombotic therapy with ___] : antithrombotic therapy with  [unfilled] [Lipid Lowering Therapy] : lipid lowering therapy [Patient encouraged to discuss with Primary MD] : I encouraged the patient to discuss these important issues with ~his/her~ primary care doctor [Goals and Counseling] : I have reviewed the goals of stroke risk factor modification. I counseled the patient on measures to reduce stroke risk, including the importance of medication compliance, risk factor control, exercise, healthy diet and avoidance of smoking. I reviewed stroke warning signs and symptoms and appropriate actions to take if such occur.

## 2024-03-14 NOTE — HISTORY OF PRESENT ILLNESS
[FreeTextEntry1] : Trent Gage presented to the office for a follow-up cardiovascular evaluation.  He was last seen in the office in October, 2022.  He is now 73 years old with a long history of hypertension, without a history of diabetes and hypercholesterolemia. He presented in March, 2012 with intermittent discomfort in his left shoulder, which did not sound ischemic, noting that it was unrelated to physical activity or emotional stress.  For this, he had nuclear stress testing, which was abnormal. He was referred for a coronary CT, which he had 4/11/2012.  This revealed double vessel coronary artery disease, with hemodynamically significant disease likely.  He was felt  to have a  ~60 stenosis of the proximal LAD and a ~70% stenosis of the circumflex. We discussed the fact that he had a relatively low risk stress test without any symptoms, and that coronary angiography was optional but not required.    Eventually, he reported symptoms which could have been considered anginal, and catheterization was performed.  This revealed 70% stenosis of the mid LAD, with generally small distal vessels, and significant branch disease. Medical therapy was felt to be appropriate.  He has ischemia provocable on stress testing, but has been asymptomatic in daily life.  He continues to report that he has no angina in his day-to-day activities, which previously included a CrossFit class for seniors.  His exercise now is mostly walking.  He denies any shortness of breath with activity. He denies orthopnea, PND and lower extremity edema. He denies palpitations, dizziness and syncope.  There have been concerns about lability of his blood pressure readings.  There was discussion, because of the lability and high blood pressure readings at the doctor's office, of increasing his medication.  He was checking his blood pressure quite regularly at home, and found readings that were generally quite well-controlled, with some outliers on the low and high end.  He has been enrolled in a longitudinal study of dementia, and has been seeing neurology and vascular neurology.  Recent MRI suggested a prior stroke.

## 2024-03-14 NOTE — DISCUSSION/SUMMARY
[FreeTextEntry1] : Trent remains physically active, and has no symptoms which could be considered angina.  He does have CAD however.  The worst of his disease is branch disease not readily amenable to PCI, based on cath from 2013.    I think he remains best served with medical therapy alone.  He will schedule a follow-up echocardiogram to reassess LV function in the setting of unrevascularized CAD.  Upon my review of his blood pressure, I think that his pressure is probably well-controlled.  We do need to confirm that his blood pressure machine works properly, and so on the day of his echo, his daughter will bring him in along with his blood pressure machine to make sure that it is accurate.  Unless we have meaningful concerns regarding a possible cardioembolic source of a stroke on MRI, I do not think I would pursue this with additional monitoring at this time.  They will let me know if the vascular neurologist has additional concerns.  He will plan to see me again in about 6 months. [EKG obtained to assist in diagnosis and management of assessed problem(s)] : EKG obtained to assist in diagnosis and management of assessed problem(s)

## 2024-03-14 NOTE — PHYSICAL EXAM
[General Appearance - Alert] : alert [General Appearance - In No Acute Distress] : in no acute distress [Oriented To Time, Place, And Person] : oriented to person, place, and time [Affect] : the affect was normal [Impaired Insight] : insight and judgment were intact [Person] : oriented to person [Place] : oriented to place [Short Term Intact] : short term memory impaired [Time] : oriented to time [Remote Intact] : remote memory intact [Span Intact] : the attention span was decreased [Registration Intact] : recent registration memory intact [Concentration Intact] : normal concentrating ability [Visual Intact] : visual attention was ~T not ~L decreased [Repeating Phrases] : no difficulty repeating a phrase [Naming Objects] : no difficulty naming common objects [Writing A Sentence] : no difficulty writing a sentence [Fluency] : fluency intact [Reading] : reading intact [Comprehension] : comprehension intact [Current Events] : inadequate knowledge of current events [Past History] : adequate knowledge of personal past history [Vocabulary] : adequate range of vocabulary [Total Score ___ / 30] : the patient achieved a score of [unfilled] /30 [Place ___ / 5] : place [unfilled] / 5 [Date / Time ___ / 5] : date / time [unfilled] / 5 [Registration ___ / 3] : registration [unfilled] / 3 [Serial Sevens ___/5] : serial sevens [unfilled] / 5 [Naming 2 Objects ___ / 2] : naming two objects [unfilled] / 2 [Repeating a Sentence ___ / 1] : repeating a sentence [unfilled] / 1 [Writing a Sentence ___ / 1] : write sentence [unfilled] / 1 [3-stage Verbal Command ___ / 3] : three-stage verbal command [unfilled] / 3 [Written Command ___ / 1] : written command [unfilled] / 1 [Copy a Design ___ / 1] : copy a design [unfilled] / 1 [Cranial Nerves Optic (II)] : visual acuity intact bilaterally,  visual fields full to confrontation, pupils equal round and reactive to light [Recall ___ / 3] : recall [unfilled] / 3 [Cranial Nerves Oculomotor (III)] : extraocular motion intact [Cranial Nerves Trigeminal (V)] : facial sensation intact symmetrically [Cranial Nerves Facial (VII)] : face symmetrical [Cranial Nerves Vestibulocochlear (VIII)] : hearing was intact bilaterally [Cranial Nerves Glossopharyngeal (IX)] : tongue and palate midline [Cranial Nerves Accessory (XI - Cranial And Spinal)] : head turning and shoulder shrug symmetric [Motor Tone] : muscle tone was normal in all four extremities [Cranial Nerves Hypoglossal (XII)] : there was no tongue deviation with protrusion [Motor Strength] : muscle strength was normal in all four extremities [Involuntary Movements] : no involuntary movements were seen [No Muscle Atrophy] : normal bulk in all four extremities [Motor Handedness Right-Handed] : the patient is right hand dominant [Motor Strength Lower Extremities Bilaterally] : strength was normal in both lower extremities [Motor Strength Upper Extremities Bilaterally] : strength was normal in both upper extremities [Sensation Tactile Decrease] : light touch was intact [Balance] : balance was intact [Past-pointing] : there was no past-pointing [Limited Balance] : balance was intact [Tremor] : no tremor present [Coordination - Dysmetria Impaired Finger-to-Nose Bilateral] : not present [Dysdiadochokinesia Bilaterally] : not present [Coordination - Dysmetria Impaired Heel-to-Shin Bilateral] : not present [2+] : Ankle jerk left 2+ [Plantar Reflex Right Only] : normal on the right [Plantar Reflex Left Only] : normal on the left [FreeTextEntry4] : Mental Status Exam\par  Presidents: 2/5 (older)\par  Alternating Pattern: 1\par  Spiral: \par  Clock: 3/3\par  Repetition: ok \par  \par  R/L discrimination on self and examiner: ok\par  Cross-line commands: ok\par  Praxis: overall intact. [Sclera] : the sclera and conjunctiva were normal [PERRL With Normal Accommodation] : pupils were equal in size, round, reactive to light, with normal accommodation [Extraocular Movements] : extraocular movements were intact [No APD] : no afferent pupillary defect [No JAMAL] : no internuclear ophthalmoplegia [Full Visual Field] : full visual field [Neck Appearance] : the appearance of the neck was normal [Outer Ear] : the ears and nose were normal in appearance [Edema] : there was no peripheral edema [Abnormal Walk] : normal gait [] : no rash [Skin Color & Pigmentation] : normal skin color and pigmentation [FreeTextEntry1] : scar from skin CA on forehead

## 2024-03-14 NOTE — PHYSICAL EXAM
[General Appearance - Well Developed] : well developed [Normal Appearance] : normal appearance [Well Groomed] : well groomed [General Appearance - Well Nourished] : well nourished [No Deformities] : no deformities [General Appearance - In No Acute Distress] : no acute distress [Normal Conjunctiva] : the conjunctiva exhibited no abnormalities [Eyelids - No Xanthelasma] : the eyelids demonstrated no xanthelasmas [Normal Oral Mucosa] : normal oral mucosa [No Oral Pallor] : no oral pallor [No Oral Cyanosis] : no oral cyanosis [Normal Jugular Venous A Waves Present] : normal jugular venous A waves present [Normal Jugular Venous V Waves Present] : normal jugular venous V waves present [No Jugular Venous Cosme A Waves] : no jugular venous cosme A waves [Exaggerated Use Of Accessory Muscles For Inspiration] : no accessory muscle use [Respiration, Rhythm And Depth] : normal respiratory rhythm and effort [Auscultation Breath Sounds / Voice Sounds] : lungs were clear to auscultation bilaterally [Abdomen Soft] : soft [Abdomen Tenderness] : non-tender [Abdomen Mass (___ Cm)] : no abdominal mass palpated [Abnormal Walk] : normal gait [Gait - Sufficient For Exercise Testing] : the gait was sufficient for exercise testing [Nail Clubbing] : no clubbing of the fingernails [Petechial Hemorrhages (___cm)] : no petechial hemorrhages [Cyanosis, Localized] : no localized cyanosis [] : no rash [Skin Color & Pigmentation] : normal skin color and pigmentation [No Venous Stasis] : no venous stasis [Skin Lesions] : no skin lesions [No Skin Ulcers] : no skin ulcer [No Xanthoma] : no  xanthoma was observed [Oriented To Time, Place, And Person] : oriented to person, place, and time [Affect] : the affect was normal [Mood] : the mood was normal [No Anxiety] : not feeling anxious [5th Left ICS - MCL] : palpated at the 5th LICS in the midclavicular line [Normal] : normal [No Precordial Heave] : no precordial heave was noted [Normal Rate] : normal [Rhythm Regular] : regular [Normal S2] : normal S2 [Normal S1] : normal S1 [No Gallop] : no gallop heard [No Murmur] : no murmurs heard [2+] : left 2+ [No Pitting Edema] : no pitting edema present [S3] : no S3 [Apical Thrill] : no thrill palpable at the apex [Click] : no click [S4] : no S4 [Right Carotid Bruit] : no bruit heard over the right carotid [Pericardial Rub] : no pericardial rub [Left Femoral Bruit] : no bruit heard over the left femoral artery [Left Carotid Bruit] : no bruit heard over the left carotid [Right Femoral Bruit] : no bruit heard over the right femoral artery [Bruit] : no bruit heard [Rt] : no varicose veins of the right leg [Lt] : no varicose veins of the left leg

## 2024-03-14 NOTE — HISTORY OF PRESENT ILLNESS
[FreeTextEntry1] : Mr. Trent Gage is a 73  R handed man here for consultation Re: - silent brain infarct ( DWI hyperintensity - right temporal region found on MRI Brain - done for participation in clinical trial for Memory disorder.   He has no symptoms suggestive of stroke or TIA.   he is being followed by  for cognitive impairment; no recent medications have been started.  Denies paranoia, delusions, hallucinations.   Below history with modification from prior notes -   PMH: HTN, HLD THOMAS?  Had a home and inpatient sleep study in 2022 that showed sleep apnea. The There was conflicting results with the home sleep study.    -Memory:  short term memory impaired in regard to conversations, also repeating questions. Long term memory there is no issue -Speech:no issues  -Orientation: no issues  -Praxis: no issues  -Decision making/Executive fx/Multitasking: ok  -Sleep: sleeps well throughout the night, some awakening, no actual gasping noted;   -Appetite: no issues   -Motor symptoms: no issues   -B/B: no issues   -Psychiatric symptoms:mood is content  -Functional status: Que[place]o Index of [place] in Activities of Daily Livin. Bathing/Showerin 2. Dressin 3. Toiletin 4. Transferrin 5. Continence: 1 6: Feedin  TOTAL:  6  [place] Instrumental Activities of Daily Living: A. Ability to Use Telephone: 1 B. Shoppin C. Food Preparation: 1 D. Housekeepin E. Laundry: 1 F. Transportation: 1 G. 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 Denies tobacco use. Socially drinks. Lives alone   PCP and other physicians: -PCP: Dr. Patrick King -Neuropsychologist:Dr. Dariela Ruiz -Cardiologist: Dr. Christie  Workup done: MRI brain in 2022,  Neuropscyh testing in 2021.

## 2024-03-14 NOTE — REASON FOR VISIT
Received report and assumed care of patient. Patient is alert and oriented x4. Patient is in no signs of distress and denies pain at this time. Patient was updated on the plan of care for the day. Patient awaiting placement to SNF. Social work working on placement. Patient aware and up to date. Call light within reach, bed in low position, 2 side rails up. Educated on fall risk, verbalizes understanding. Will continue to monitor   [Follow-Up - Clinic] : a clinic follow-up of [Abnormal Test Result] : an abnormal test result [Coronary Artery Disease] : coronary artery disease [Hyperlipidemia] : hyperlipidemia [Hypertension] : hypertension

## 2024-03-15 ENCOUNTER — APPOINTMENT (OUTPATIENT)
Dept: PULMONOLOGY | Facility: CLINIC | Age: 74
End: 2024-03-15
Payer: MEDICARE

## 2024-03-15 ENCOUNTER — TRANSCRIPTION ENCOUNTER (OUTPATIENT)
Age: 74
End: 2024-03-15

## 2024-03-15 VITALS
DIASTOLIC BLOOD PRESSURE: 75 MMHG | HEART RATE: 58 BPM | WEIGHT: 155 LBS | BODY MASS INDEX: 23.49 KG/M2 | RESPIRATION RATE: 14 BRPM | HEIGHT: 68 IN | SYSTOLIC BLOOD PRESSURE: 150 MMHG | OXYGEN SATURATION: 97 % | TEMPERATURE: 97.6 F

## 2024-03-15 PROCEDURE — 99213 OFFICE O/P EST LOW 20 MIN: CPT

## 2024-03-15 NOTE — HISTORY OF PRESENT ILLNESS
[Never] : never [TextBox_4] : This is a 73-year-old male with a significant past medical history of THOMAS and mild congitive impairment who comes in for a follow up.   The patient underwent a sleep study and is here to review the results. He would like to know if he needs treatment for THOMAS to improve his MCI.  Of note: Patient presents for follow-up regarding previously diagnosed mild sleep apnea. The patient reports generally good sleep quality, going to bed between 9 to 10 PM and waking up around 6 to 7 AM, with one awakening during the night not related to bathroom needs. The patient denies experiencing dry mouth, headaches, or snoring upon waking, but mentions occasional heavy breathing. The patient acknowledges a diagnosis of mild cognitive impairment with minimal short-term memory issues but is able to perform day-to-day activities. Previous sleep studies indicated mild sleep apnea, with difficulty breathing noted when using the machine during the second study. The patient has not been treated with a CPAP machine. [Awakes Unrefreshed] : does not awaken unrefreshed [Obstructive Sleep Apnea] : obstructive sleep apnea [Awakes with Dry Mouth] : does not awaken with dry mouth [Awakes with Headache] : does not awaken with headache [Nonrestorative Sleep] : denies nonrestorative sleep [Frequent Nocturnal Awakening] : denies frequent nocturnal awakening [Snoring] : no snoring [Unintentional Sleep while Active] : no unintentional sleep while active [Witnessed Apneas] : no witnessed apneas [DMS] : does not have difficulty maintaining sleep [DIS] : does not have difficulty initiating sleep [Cataplexy] : no cataplexy [Hypersomnolence] : no hypersomnolence [Hypnagogic Hallucinations] : no hypnagogic hallucinations [Sleep Paralysis] : no sleep paralysis [Lower Extremity Discomfort] : does not have lower extremity discomfort [LE discomfort relieved by movement] : denies lower extremity discomfort relieved by movement [Irresistible urge to move legs] : does not have irresistible urge to move legs [Late day/ Evening symptoms] : does not have late day/ evening symptoms [Sleep Disturbances due to LE symptoms] : denies sleep disturbances due to lower extremity symptoms [TextBox_79] : 6-7am [TextBox_77] : 9:30am [TextBox_81] : 15 [TextBox_83] : 0-1

## 2024-03-15 NOTE — PHYSICAL EXAM
[Low Lying Soft Palate] : low lying soft palate [III] : Mallampati Class: III [No Neck Mass] : no neck mass [Normal Appearance] : normal appearance [Normal Rate/Rhythm] : normal rate/rhythm [Normal S1, S2] : normal s1, s2 [No Murmurs] : no murmurs [Clear to Auscultation Bilaterally] : clear to auscultation bilaterally [No Resp Distress] : no resp distress [No Cyanosis] : no cyanosis [No Clubbing] : no clubbing [No Edema] : no edema [Normal Color/ Pigmentation] : normal color/ pigmentation [No Focal Deficits] : no focal deficits [Normal Mood] : normal mood [Normal Affect] : normal affect

## 2024-03-15 NOTE — ASSESSMENT
[FreeTextEntry1] : This is a 73-year-old male with a significant past medical history of THOMAS and mild cognitive impairment who comes in for a follow up.   Mild Sleep Apnea Plan: 1. Review previous sleep studies indicating very mild sleep apnea and difficulty with CPAP machine settings. 2. The patient continues to have mild obstructive sleep apnea on recent HST however there was a mix of obstructive and central apneas. 3. Patient with no sleep symptoms at this time. 4. Patient had treatment emergent central apneas with low CPAP pressures.  5. Given lack of symptoms and mixed of obstructive and central apneas it is not in the patient's best interest to start therapy.   Mild Cognitive Impairment Plan: 1. Patient acknowledges mild short-term memory problems but is able to perform day-to-day activities. 2. Given persistent mild sleep apnea without significant oxygen desaturation and no significant symptoms, will differ treatment as it will likely not improve outcome of MCI.   Follow-up: Patient can follow up with me as needed.

## 2024-03-18 ENCOUNTER — APPOINTMENT (OUTPATIENT)
Dept: CARDIOLOGY | Facility: CLINIC | Age: 74
End: 2024-03-18
Payer: MEDICARE

## 2024-03-18 VITALS
SYSTOLIC BLOOD PRESSURE: 120 MMHG | OXYGEN SATURATION: 97 % | DIASTOLIC BLOOD PRESSURE: 64 MMHG | HEIGHT: 68 IN | HEART RATE: 61 BPM

## 2024-03-18 PROCEDURE — 93306 TTE W/DOPPLER COMPLETE: CPT

## 2024-03-18 PROCEDURE — 99211 OFF/OP EST MAY X REQ PHY/QHP: CPT

## 2024-03-20 ENCOUNTER — APPOINTMENT (OUTPATIENT)
Dept: NEUROLOGY | Facility: CLINIC | Age: 74
End: 2024-03-20

## 2024-04-12 ENCOUNTER — APPOINTMENT (OUTPATIENT)
Dept: GERIATRICS | Facility: CLINIC | Age: 74
End: 2024-04-12
Payer: MEDICARE

## 2024-04-12 VITALS
BODY MASS INDEX: 23.79 KG/M2 | HEIGHT: 68 IN | OXYGEN SATURATION: 99 % | SYSTOLIC BLOOD PRESSURE: 157 MMHG | WEIGHT: 157 LBS | DIASTOLIC BLOOD PRESSURE: 76 MMHG | HEART RATE: 57 BPM | TEMPERATURE: 97.1 F

## 2024-04-12 DIAGNOSIS — Z12.83 ENCOUNTER FOR SCREENING FOR MALIGNANT NEOPLASM OF SKIN: ICD-10-CM

## 2024-04-12 DIAGNOSIS — Z13.820 ENCOUNTER FOR SCREENING FOR OSTEOPOROSIS: ICD-10-CM

## 2024-04-12 DIAGNOSIS — G47.33 OBSTRUCTIVE SLEEP APNEA (ADULT) (PEDIATRIC): ICD-10-CM

## 2024-04-12 DIAGNOSIS — Z12.12 ENCOUNTER FOR SCREENING FOR MALIGNANT NEOPLASM OF COLON: ICD-10-CM

## 2024-04-12 DIAGNOSIS — Z71.89 OTHER SPECIFIED COUNSELING: ICD-10-CM

## 2024-04-12 DIAGNOSIS — Z12.11 ENCOUNTER FOR SCREENING FOR MALIGNANT NEOPLASM OF COLON: ICD-10-CM

## 2024-04-12 PROCEDURE — 99483 ASSMT & CARE PLN PT COG IMP: CPT

## 2024-06-25 ENCOUNTER — APPOINTMENT (OUTPATIENT)
Dept: NEUROLOGY | Facility: CLINIC | Age: 74
End: 2024-06-25
Payer: COMMERCIAL

## 2024-06-25 VITALS
DIASTOLIC BLOOD PRESSURE: 85 MMHG | HEART RATE: 64 BPM | BODY MASS INDEX: 22.73 KG/M2 | SYSTOLIC BLOOD PRESSURE: 138 MMHG | HEIGHT: 68 IN | WEIGHT: 150 LBS

## 2024-06-25 DIAGNOSIS — I67.9 CEREBROVASCULAR DISEASE, UNSPECIFIED: ICD-10-CM

## 2024-06-25 DIAGNOSIS — G31.84 MILD COGNITIVE IMPAIRMENT, SO STATED: ICD-10-CM

## 2024-06-25 PROCEDURE — G2211 COMPLEX E/M VISIT ADD ON: CPT

## 2024-06-25 PROCEDURE — 99214 OFFICE O/P EST MOD 30 MIN: CPT

## 2024-06-27 ENCOUNTER — APPOINTMENT (OUTPATIENT)
Dept: NEUROLOGY | Facility: CLINIC | Age: 74
End: 2024-06-27
Payer: MEDICARE

## 2024-06-27 VITALS
BODY MASS INDEX: 22.73 KG/M2 | SYSTOLIC BLOOD PRESSURE: 128 MMHG | DIASTOLIC BLOOD PRESSURE: 76 MMHG | HEIGHT: 68 IN | WEIGHT: 150 LBS | HEART RATE: 69 BPM

## 2024-06-27 DIAGNOSIS — I67.9 CEREBROVASCULAR DISEASE, UNSPECIFIED: ICD-10-CM

## 2024-06-27 DIAGNOSIS — I10 ESSENTIAL (PRIMARY) HYPERTENSION: ICD-10-CM

## 2024-06-27 DIAGNOSIS — I65.23 OCCLUSION AND STENOSIS OF BILATERAL CAROTID ARTERIES: ICD-10-CM

## 2024-06-27 PROCEDURE — 99215 OFFICE O/P EST HI 40 MIN: CPT

## 2024-07-15 ENCOUNTER — RX RENEWAL (OUTPATIENT)
Age: 74
End: 2024-07-15

## 2024-08-13 ENCOUNTER — APPOINTMENT (OUTPATIENT)
Dept: GERIATRICS | Facility: CLINIC | Age: 74
End: 2024-08-13
Payer: MEDICARE

## 2024-08-13 VITALS
HEART RATE: 64 BPM | SYSTOLIC BLOOD PRESSURE: 157 MMHG | WEIGHT: 150 LBS | OXYGEN SATURATION: 99 % | BODY MASS INDEX: 22.73 KG/M2 | TEMPERATURE: 97.2 F | HEIGHT: 68 IN | DIASTOLIC BLOOD PRESSURE: 77 MMHG

## 2024-08-13 DIAGNOSIS — G31.84 MILD COGNITIVE IMPAIRMENT, SO STATED: ICD-10-CM

## 2024-08-13 DIAGNOSIS — Z09 ENCOUNTER FOR FOLLOW-UP EXAMINATION AFTER COMPLETED TREATMENT FOR CONDITIONS OTHER THAN MALIGNANT NEOPLASM: ICD-10-CM

## 2024-08-13 DIAGNOSIS — Z12.12 ENCOUNTER FOR SCREENING FOR MALIGNANT NEOPLASM OF COLON: ICD-10-CM

## 2024-08-13 DIAGNOSIS — G47.33 OBSTRUCTIVE SLEEP APNEA (ADULT) (PEDIATRIC): ICD-10-CM

## 2024-08-13 DIAGNOSIS — Z12.83 ENCOUNTER FOR SCREENING FOR MALIGNANT NEOPLASM OF SKIN: ICD-10-CM

## 2024-08-13 DIAGNOSIS — Z12.11 ENCOUNTER FOR SCREENING FOR MALIGNANT NEOPLASM OF COLON: ICD-10-CM

## 2024-08-13 DIAGNOSIS — Z13.820 ENCOUNTER FOR SCREENING FOR OSTEOPOROSIS: ICD-10-CM

## 2024-08-13 DIAGNOSIS — I10 ESSENTIAL (PRIMARY) HYPERTENSION: ICD-10-CM

## 2024-08-13 PROCEDURE — G2211 COMPLEX E/M VISIT ADD ON: CPT

## 2024-08-13 PROCEDURE — 99214 OFFICE O/P EST MOD 30 MIN: CPT

## 2024-08-13 NOTE — ASSESSMENT
[FreeTextEntry1] :  BP elevated today, labile in general - Recent cards eval/recs appreciated - c/w antihypertensives as per cards - Losartan 75mg daily - c/w Toprol 25mg daily - caution in setting of bradycardia  Repeat BMP  Neuro f/u appreciated in EMR  - Concern about driving safety previously discussed, including option for formal driving assessment.  Advised family to drive with patient regularly to monitor driving behavior. No recent unsafe driving behavior noted  - SW available for additional counseling, education, support, and community resources - including available support groups and classes for caregiver, and continuation of safety counseling including re: risk of financial errors/abuse, falls, injury, wandering, firearm safety  Pt/dtr to request medical records from PMD and GI - GI Dr. Pak - pending repeat colonoscopy report from 2023 - dtr will f/u request copy  - immunization records - to clarify shingrix, Tdap vaccines?  Optho f/u annually and PRN adv Derm f/u next week planned - f/u as per derm recommendations  f/u in 3 mo, unless earlier PRN.

## 2024-08-13 NOTE — REASON FOR VISIT
[Follow-Up] : a follow-up visit [Family Member] : family member [FreeTextEntry1] : HTN, dementia [FreeTextEntry3] :  Daughter Sole  [phone] [FreeTextEntry2] : who assists with history due to patient with baseline cognitive impairment

## 2024-08-13 NOTE — HISTORY OF PRESENT ILLNESS
[Patient declined hearing screen] : Patient declined hearing screen [No falls in past year] : Patient reported no falls in the past year [Completely Independent] : Completely independent. [Patient is independent with] : bathing [Independent] : managing finances [Full assistance needed] : Assistance needed managing medications [FAST Score: ____] : Functional Assessment Scale (FAST) Score: [unfilled] [Smoke Detector] : smoke detector [Carbon Monoxide Detector] : carbon monoxide detector [Driving Concerns] : driving concerns noted [Wears Seat Belt] : wears seat belt [0] : 2) Feeling down, depressed, or hopeless: Not at all (0) [PHQ-2 Negative - No further assessment needed] : PHQ-2 Negative - No further assessment needed [Mild] : Stage: Mild [Stable] : Status: Stable [Memory Lapses Or Loss] : stable memory impairment [Patient Observed To Be Agitated] : denies agitation [Hostility Toward Caregivers] : denies aggression [Sleep Disturbances] : denies sleep disturbances [] : denies wandering [Fixed Beliefs Contradicted By Reality (Delusions)] : denies delusions [Difficulty Finding Desired Words] : stable difficulty finding desired words [With Patient/Caregiver] : , with patient/caregiver [Reviewed no changes] : Reviewed, no changes [Designated Healthcare Proxy] : Designated healthcare proxy [BreastSonogramDate] : 1/2024 [TextBox_25] : Dexa 1/24: NL, repeat exam in 5yrs rec.  [TextBox_31] : Denies problems hearing [BoneDensityDate] : 2024 [TextBox_37] : Samm Nicole @ [facility] - follows annually s/p cataract sx in 2019 [ColonoscopyDate] : 2022 [TextBox_43] : due for f/u [Mammogramdate] : 2023 [TextBox_19] : GI Dr. Pak - pending repeat colonoscopy report from 2023 - dtr will request copy  [FreeTextEntry1] : 2023 [FreeTextEntry2] : Dr. Jamie Lemus, s/p Moh's 2023, pending f/u in 2024  [Grab Bars] : no grab bars [Shower Chair] : no shower chair [Night Light] : no night light [Getupandgo] : <12  [de-identified] : family helps [de-identified] : dtr in [place] orders food for delivery to home  [de-identified] : cleaning help every other week  [FreeTextEntry6] : deny driving safety concerns, dtr drives with patient occasionally and has not seen any unsafe driving behavior  [FreeTextEntry7] : took double dose of meds after a med change previously, dtr help [de-identified] : PHQ2 of 0 on 4/12/24  [XIX6Rgnop] : 0 [GDS] : 3 on 4/12/24 per pt  [AdvancecareDate] : 4/12/24 [FreeTextEntry4] : HCP form on file: primary is dtr Flory ANGEL), dtr Gerda is alternate Living will on file - reviewed previously.  GOC discussed w/ pt - stated being bedbound is not acceptable QOL.  QOL is most important.  Not being a burden on family is also important.  Currently would wish to be resuscitated/intubated if chance of recovery to acceptable QOL possible.  If unable to make decisions for himself anymore would not wish to prolong life.

## 2024-08-13 NOTE — PHYSICAL EXAM
[Normal] : normal gait, no clubbing or cyanosis of the fingernails, muscle strength and tone were normal, no involuntary movements were seen [No Focal Deficits] : no focal deficits [Normal Affect] : the affect was normal [Normal Mood] : the mood was normal [Normal Insight/Judgment] : insight and judgment were not intact [de-identified] : scattered scabs/excoriations/ecchymoses - including on scalp [de-identified] : calm, cooperative  [MocaTotal] : 10 [FreeTextEntry1] : 4/12/24

## 2024-08-14 LAB
ANION GAP SERPL CALC-SCNC: 12 MMOL/L
BUN SERPL-MCNC: 18 MG/DL
CALCIUM SERPL-MCNC: 9.8 MG/DL
CHLORIDE SERPL-SCNC: 105 MMOL/L
CO2 SERPL-SCNC: 25 MMOL/L
CREAT SERPL-MCNC: 0.95 MG/DL
EGFR: 84 ML/MIN/1.73M2
GLUCOSE SERPL-MCNC: 91 MG/DL
POTASSIUM SERPL-SCNC: 4.3 MMOL/L
SODIUM SERPL-SCNC: 142 MMOL/L

## 2024-09-12 ENCOUNTER — APPOINTMENT (OUTPATIENT)
Dept: CARDIOLOGY | Facility: CLINIC | Age: 74
End: 2024-09-12
Payer: COMMERCIAL

## 2024-09-12 ENCOUNTER — NON-APPOINTMENT (OUTPATIENT)
Age: 74
End: 2024-09-12

## 2024-09-12 VITALS
DIASTOLIC BLOOD PRESSURE: 82 MMHG | BODY MASS INDEX: 22.88 KG/M2 | OXYGEN SATURATION: 99 % | WEIGHT: 151 LBS | HEART RATE: 58 BPM | SYSTOLIC BLOOD PRESSURE: 155 MMHG | HEIGHT: 68 IN

## 2024-09-12 DIAGNOSIS — I25.119 ATHEROSCLEROTIC HEART DISEASE OF NATIVE CORONARY ARTERY WITH UNSPECIFIED ANGINA PECTORIS: ICD-10-CM

## 2024-09-12 PROCEDURE — 99204 OFFICE O/P NEW MOD 45 MIN: CPT | Mod: 25

## 2024-09-12 PROCEDURE — 99214 OFFICE O/P EST MOD 30 MIN: CPT | Mod: 25

## 2024-09-12 PROCEDURE — 93000 ELECTROCARDIOGRAM COMPLETE: CPT

## 2024-09-12 PROCEDURE — G2211 COMPLEX E/M VISIT ADD ON: CPT | Mod: NC

## 2024-09-12 NOTE — REASON FOR VISIT
normal... [Follow-Up - Clinic] : a clinic follow-up of [Abnormal Test Result] : an abnormal test result [Coronary Artery Disease] : coronary artery disease [Hyperlipidemia] : hyperlipidemia [Hypertension] : hypertension

## 2024-09-12 NOTE — PHYSICAL EXAM
[General Appearance - Well Developed] : well developed [Normal Appearance] : normal appearance [General Appearance - Well Nourished] : well nourished [Well Groomed] : well groomed [No Deformities] : no deformities [General Appearance - In No Acute Distress] : no acute distress [Normal Conjunctiva] : the conjunctiva exhibited no abnormalities [Eyelids - No Xanthelasma] : the eyelids demonstrated no xanthelasmas [Normal Oral Mucosa] : normal oral mucosa [No Oral Pallor] : no oral pallor [No Oral Cyanosis] : no oral cyanosis [Normal Jugular Venous A Waves Present] : normal jugular venous A waves present [No Jugular Venous Cosme A Waves] : no jugular venous cosme A waves [Normal Jugular Venous V Waves Present] : normal jugular venous V waves present [Respiration, Rhythm And Depth] : normal respiratory rhythm and effort [Exaggerated Use Of Accessory Muscles For Inspiration] : no accessory muscle use [Auscultation Breath Sounds / Voice Sounds] : lungs were clear to auscultation bilaterally [Abdomen Soft] : soft [Abdomen Tenderness] : non-tender [Abdomen Mass (___ Cm)] : no abdominal mass palpated [Gait - Sufficient For Exercise Testing] : the gait was sufficient for exercise testing [Abnormal Walk] : normal gait [Nail Clubbing] : no clubbing of the fingernails [Cyanosis, Localized] : no localized cyanosis [Petechial Hemorrhages (___cm)] : no petechial hemorrhages [Skin Color & Pigmentation] : normal skin color and pigmentation [] : no rash [No Venous Stasis] : no venous stasis [No Skin Ulcers] : no skin ulcer [Skin Lesions] : no skin lesions [No Xanthoma] : no  xanthoma was observed [Oriented To Time, Place, And Person] : oriented to person, place, and time [Affect] : the affect was normal [Mood] : the mood was normal [No Anxiety] : not feeling anxious [5th Left ICS - MCL] : palpated at the 5th LICS in the midclavicular line [Normal] : normal [No Precordial Heave] : no precordial heave was noted [Normal Rate] : normal [Rhythm Regular] : regular [Normal S1] : normal S1 [Normal S2] : normal S2 [No Gallop] : no gallop heard [No Murmur] : no murmurs heard [2+] : left 2+ [No Pitting Edema] : no pitting edema present [Apical Thrill] : no thrill palpable at the apex [S3] : no S3 [S4] : no S4 [Click] : no click [Pericardial Rub] : no pericardial rub [Left Carotid Bruit] : no bruit heard over the left carotid [Right Carotid Bruit] : no bruit heard over the right carotid [Right Femoral Bruit] : no bruit heard over the right femoral artery [Left Femoral Bruit] : no bruit heard over the left femoral artery [Bruit] : no bruit heard [Rt] : no varicose veins of the right leg [Lt] : no varicose veins of the left leg

## 2024-09-12 NOTE — DISCUSSION/SUMMARY
[FreeTextEntry1] : Trent remains physically active, and has no symptoms which could be considered angina.  He does have CAD however.  The worst of his disease is branch disease not readily amenable to PCI, based on cath from 2013.    I think he remains best served with medical therapy alone.     Echocardiography was performed March 18, 2024.  This revealed a normal ejection fraction with mild dilatation of the ascending aorta, and no significant valvular disease.  His blood pressure is elevated, and he has not been as diligent about checking it at home.  He will check it more frequently in the near future.  He has a visit with his primary care physician in November, and I imagine blood work will be performed at that time.  He will plan to see me again in about 6 months. [EKG obtained to assist in diagnosis and management of assessed problem(s)] : EKG obtained to assist in diagnosis and management of assessed problem(s)

## 2024-09-12 NOTE — HISTORY OF PRESENT ILLNESS
[FreeTextEntry1] : Trent Gage presented to the office for a follow-up cardiovascular evaluation.  He was last seen in the office in March, 2024.  He is now 73 years old with a long history of hypertension, without a history of diabetes and hypercholesterolemia. He presented in March, 2012 with intermittent discomfort in his left shoulder, which did not sound ischemic, noting that it was unrelated to physical activity or emotional stress.  For this, he had nuclear stress testing, which was abnormal. He was referred for a coronary CT, which he had 4/11/2012.  This revealed double vessel coronary artery disease, with hemodynamically significant disease likely.  He was felt  to have a  ~60 stenosis of the proximal LAD and a ~70% stenosis of the circumflex. We discussed the fact that he had a relatively low risk stress test without any symptoms, and that coronary angiography was optional but not required.    Eventually, he reported symptoms which could have been considered anginal, and catheterization was performed.  This revealed 70% stenosis of the mid LAD, with generally small distal vessels, and significant branch disease. Medical therapy was felt to be appropriate.  He has ischemia provocable on stress testing, but has been asymptomatic in daily life.  I saw him in March, 2024, at which time he had been doing well.  His blood pressure was labile.  I felt it was controlled overall.  He had been enrolled in a longitudinal study of dementia, and a scan associated with that study revealed a prior stroke.  I recommended an echocardiogram.  Echocardiography was performed March 18, 2024.  This revealed a normal ejection fraction with mild dilatation of the ascending aorta, and no significant valvular disease.  He continues to report that he has no angina in his day-to-day activities, which previously included a CrossFit class for seniors.  His exercise now is mostly walking.  He denies any shortness of breath with activity. He denies orthopnea, PND and lower extremity edema. He denies palpitations, dizziness and syncope.  There have been concerns about lability of his blood pressure readings.  There was discussion, because of the lability and high blood pressure readings at the doctor's office, of increasing his medication.  He was checking his blood pressure quite regularly at home, and found readings that were generally quite well-controlled.  He has not been checking as much recently.  He has been enrolled in a longitudinal study of dementia, and has been seeing neurology and vascular neurology.

## 2024-09-24 ENCOUNTER — APPOINTMENT (OUTPATIENT)
Dept: NEUROLOGY | Facility: CLINIC | Age: 74
End: 2024-09-24
Payer: COMMERCIAL

## 2024-09-24 DIAGNOSIS — I65.23 OCCLUSION AND STENOSIS OF BILATERAL CAROTID ARTERIES: ICD-10-CM

## 2024-09-24 DIAGNOSIS — I67.9 CEREBROVASCULAR DISEASE, UNSPECIFIED: ICD-10-CM

## 2024-09-24 PROCEDURE — 99213 OFFICE O/P EST LOW 20 MIN: CPT

## 2024-09-24 NOTE — HISTORY OF PRESENT ILLNESS
[FreeTextEntry1] : Mr. Trent Gage is a 73  R handed man here for consultation Re: - silent brain infarct ( DWI hyperintensity - right temporal region found on MRI Brain - done for participation in clinical trial for Memory disorder.   He has no symptoms suggestive of stroke or TIA.   he is being followed by  for cognitive impairment; no recent medications have been started.  Denies paranoia, delusions, hallucinations.  Below history with modification from prior notes -   PMH: HTN, HLD THOMAS?  Had a home and inpatient sleep study in 2022 that showed sleep apnea. The There was conflicting results with the home sleep study.    -Memory:  short term memory impaired in regard to conversations, also repeating questions. Long term memory there is no issue -Speech:no issues  -Orientation: no issues  -Praxis: no issues  -Decision making/Executive fx/Multitasking: ok  -Sleep: sleeps well throughout the night, some awakening, no actual gasping noted;   -Appetite: no issues   -Motor symptoms: no issues   -B/B: no issues   -Psychiatric symptoms:mood is content  -Functional status: [place] Index of [place] in Activities of Daily Livin. Bathing/Showerin 2. Dressin 3. Toiletin 4. Transferrin 5. Continence: 1 6: Feedin  TOTAL:  6  [place] Instrumental Activities of Daily Living: A. Ability to Use Telephone: 1 B. Shoppin C. Food Preparation: 1 D. Housekeepin E. Laundry: 1 F. Transportation: 1 G. 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 Denies tobacco use. Socially drinks. Lives alone   PCP and other physicians: -PCP: Dr. Patrick King -Neuropsychologist:Dr. Dariela Ruiz -Cardiologist: Dr. Christie  Workup done: MRI brain in 2022,  Neuropscyh testing in 2021.  2024 Today he reports feeling well. Denies any new or worsening symptoms. BP at been slightly elevated at home but he recently saw cardiology with no changes made. Notes compliance with meds.

## 2024-09-24 NOTE — DISCUSSION/SUMMARY
[Antithrombotic therapy with ___] : antithrombotic therapy with  [unfilled] [Intensive Blood Pressure Control] : intensive blood pressure control [Lipid Lowering Therapy] : lipid lowering therapy [Patient encouraged to discuss with Primary MD] : I encouraged the patient to discuss these important issues with ~his/her~ primary care doctor [Goals and Counseling] : I have reviewed the goals of stroke risk factor modification. I counseled the patient on measures to reduce stroke risk, including the importance of medication compliance, risk factor control, exercise, healthy diet and avoidance of smoking. I reviewed stroke warning signs and symptoms and appropriate actions to take if such occur. [FreeTextEntry1] : Mr. Gage is a very pleasant 73-year-old man who is here for consultation regarding incidental right temp hyperintensity found on MRI that was performed for the purpose of screening for clinical trial for cognitive impairment. He has had no neurological symptoms.  Ischemic stroke or TIA.  MRI was completed on March 11. I reviewed neuroimaging with the patient and his daughter, there is small diffusion-weighted imaging hyperintensity in the right temporal lobe that could suggest a silent brain infarct.  Differential diagnosis also includes an artifact. He has vascular risk factors of hypertension hyperlipidemia however they are very well-controlled.  From my standpoint he should continue to take aspirin and atorvastatin.  He is seeing his cardiologist Dr. Christie today.  He will benefit with a repeat echocardiogram, previous echocardiogram from 2022 has been within normal limits.  I also spoke to the patient and family regarding short-term/long-term cardiac monitoring with loop recorder to rule out paroxysmal atrial fibrillation.  His carotid Doppler study showed mild less than 40% stenosis of bilateral carotid arteries with heterogeneous plaque in the right he can follow-up with yearly Dopplers and transcranial Doppler at our office.    Patient has no new neurological symptoms, is asymptomatic therefore repeat MRI may not be necessary.  Will continue aspirin and aggressive risk factor control especially BP. Advised that is it is consistently elevated to contact cardiology.   We will follow up with him in 1 year or sooner if needed with repeat TCD and Doppler

## 2024-11-15 ENCOUNTER — APPOINTMENT (OUTPATIENT)
Dept: GERIATRICS | Facility: CLINIC | Age: 74
End: 2024-11-15

## 2024-12-23 ENCOUNTER — RX RENEWAL (OUTPATIENT)
Age: 74
End: 2024-12-23

## 2025-02-03 ENCOUNTER — RX RENEWAL (OUTPATIENT)
Age: 75
End: 2025-02-03

## 2025-02-25 ENCOUNTER — NON-APPOINTMENT (OUTPATIENT)
Age: 75
End: 2025-02-25

## 2025-02-25 ENCOUNTER — APPOINTMENT (OUTPATIENT)
Dept: GERIATRICS | Facility: CLINIC | Age: 75
End: 2025-02-25

## 2025-02-25 VITALS — DIASTOLIC BLOOD PRESSURE: 86 MMHG | SYSTOLIC BLOOD PRESSURE: 148 MMHG

## 2025-02-25 VITALS
OXYGEN SATURATION: 98 % | TEMPERATURE: 97.2 F | DIASTOLIC BLOOD PRESSURE: 88 MMHG | HEIGHT: 68 IN | SYSTOLIC BLOOD PRESSURE: 158 MMHG | BODY MASS INDEX: 23.64 KG/M2 | HEART RATE: 63 BPM | WEIGHT: 156 LBS | RESPIRATION RATE: 14 BRPM

## 2025-02-25 DIAGNOSIS — Z13.21 ENCOUNTER FOR SCREENING FOR NUTRITIONAL DISORDER: ICD-10-CM

## 2025-02-25 DIAGNOSIS — E55.9 VITAMIN D DEFICIENCY, UNSPECIFIED: ICD-10-CM

## 2025-02-25 DIAGNOSIS — G31.84 MILD COGNITIVE IMPAIRMENT, SO STATED: ICD-10-CM

## 2025-02-25 DIAGNOSIS — I10 ESSENTIAL (PRIMARY) HYPERTENSION: ICD-10-CM

## 2025-02-25 DIAGNOSIS — I67.9 CEREBROVASCULAR DISEASE, UNSPECIFIED: ICD-10-CM

## 2025-02-25 PROCEDURE — G2211 COMPLEX E/M VISIT ADD ON: CPT

## 2025-02-25 PROCEDURE — 99214 OFFICE O/P EST MOD 30 MIN: CPT

## 2025-03-03 ENCOUNTER — APPOINTMENT (OUTPATIENT)
Dept: NEUROLOGY | Facility: CLINIC | Age: 75
End: 2025-03-03
Payer: MEDICARE

## 2025-03-03 VITALS
SYSTOLIC BLOOD PRESSURE: 143 MMHG | BODY MASS INDEX: 23.64 KG/M2 | WEIGHT: 156 LBS | HEIGHT: 68 IN | DIASTOLIC BLOOD PRESSURE: 74 MMHG | HEART RATE: 66 BPM

## 2025-03-03 DIAGNOSIS — I65.23 OCCLUSION AND STENOSIS OF BILATERAL CAROTID ARTERIES: ICD-10-CM

## 2025-03-03 DIAGNOSIS — I67.9 CEREBROVASCULAR DISEASE, UNSPECIFIED: ICD-10-CM

## 2025-03-03 PROCEDURE — 99215 OFFICE O/P EST HI 40 MIN: CPT

## 2025-04-04 ENCOUNTER — NON-APPOINTMENT (OUTPATIENT)
Age: 75
End: 2025-04-04

## 2025-04-04 ENCOUNTER — APPOINTMENT (OUTPATIENT)
Dept: CARDIOLOGY | Facility: CLINIC | Age: 75
End: 2025-04-04
Payer: MEDICARE

## 2025-04-04 VITALS
HEIGHT: 68 IN | WEIGHT: 160 LBS | HEART RATE: 63 BPM | SYSTOLIC BLOOD PRESSURE: 133 MMHG | BODY MASS INDEX: 24.25 KG/M2 | OXYGEN SATURATION: 99 % | DIASTOLIC BLOOD PRESSURE: 71 MMHG

## 2025-04-04 DIAGNOSIS — I25.119 ATHEROSCLEROTIC HEART DISEASE OF NATIVE CORONARY ARTERY WITH UNSPECIFIED ANGINA PECTORIS: ICD-10-CM

## 2025-04-04 PROCEDURE — 93000 ELECTROCARDIOGRAM COMPLETE: CPT

## 2025-04-04 PROCEDURE — 99215 OFFICE O/P EST HI 40 MIN: CPT

## 2025-05-05 ENCOUNTER — RX RENEWAL (OUTPATIENT)
Age: 75
End: 2025-05-05

## 2025-06-17 ENCOUNTER — APPOINTMENT (OUTPATIENT)
Dept: GERIATRICS | Facility: CLINIC | Age: 75
End: 2025-06-17

## 2025-07-01 ENCOUNTER — LABORATORY RESULT (OUTPATIENT)
Age: 75
End: 2025-07-01

## 2025-07-01 ENCOUNTER — APPOINTMENT (OUTPATIENT)
Dept: GERIATRICS | Facility: CLINIC | Age: 75
End: 2025-07-01
Payer: MEDICARE

## 2025-07-01 ENCOUNTER — APPOINTMENT (OUTPATIENT)
Dept: NEUROLOGY | Facility: CLINIC | Age: 75
End: 2025-07-01
Payer: MEDICARE

## 2025-07-01 ENCOUNTER — NON-APPOINTMENT (OUTPATIENT)
Age: 75
End: 2025-07-01

## 2025-07-01 VITALS
WEIGHT: 156.5 LBS | OXYGEN SATURATION: 98 % | HEIGHT: 68 IN | TEMPERATURE: 97.1 F | RESPIRATION RATE: 16 BRPM | HEART RATE: 60 BPM | SYSTOLIC BLOOD PRESSURE: 152 MMHG | DIASTOLIC BLOOD PRESSURE: 79 MMHG | BODY MASS INDEX: 23.72 KG/M2

## 2025-07-01 VITALS
BODY MASS INDEX: 22.73 KG/M2 | HEIGHT: 68 IN | SYSTOLIC BLOOD PRESSURE: 149 MMHG | HEART RATE: 60 BPM | DIASTOLIC BLOOD PRESSURE: 81 MMHG | WEIGHT: 150 LBS

## 2025-07-01 LAB
ALBUMIN SERPL ELPH-MCNC: 4 G/DL
ALP BLD-CCNC: 56 U/L
ALT SERPL-CCNC: 18 U/L
ANION GAP SERPL CALC-SCNC: 13 MMOL/L
APTT BLD: 25.5 SEC
AST SERPL-CCNC: 27 U/L
BASOPHILS # BLD AUTO: 0.04 K/UL
BASOPHILS NFR BLD AUTO: 0.6 %
BILIRUB SERPL-MCNC: 2.2 MG/DL
BUN SERPL-MCNC: 15 MG/DL
CALCIUM SERPL-MCNC: 9.5 MG/DL
CHLORIDE SERPL-SCNC: 105 MMOL/L
CO2 SERPL-SCNC: 23 MMOL/L
CREAT SERPL-MCNC: 0.98 MG/DL
EGFRCR SERPLBLD CKD-EPI 2021: 80 ML/MIN/1.73M2
EOSINOPHIL # BLD AUTO: 0.09 K/UL
EOSINOPHIL NFR BLD AUTO: 1.4 %
FOLATE SERPL-MCNC: 9.7 NG/ML
GLUCOSE SERPL-MCNC: 102 MG/DL
HCT VFR BLD CALC: 36.4 %
HCYS SERPL-MCNC: 13.4 UMOL/L
HGB BLD-MCNC: 12.6 G/DL
IMM GRANULOCYTES NFR BLD AUTO: 0.3 %
INR PPP: 0.89 RATIO
LYMPHOCYTES # BLD AUTO: 1.27 K/UL
LYMPHOCYTES NFR BLD AUTO: 20 %
MAN DIFF?: NORMAL
MCHC RBC-ENTMCNC: 33.2 PG
MCHC RBC-ENTMCNC: 34.6 G/DL
MCV RBC AUTO: 95.8 FL
MONOCYTES # BLD AUTO: 0.56 K/UL
MONOCYTES NFR BLD AUTO: 8.8 %
NEUTROPHILS # BLD AUTO: 4.38 K/UL
NEUTROPHILS NFR BLD AUTO: 68.9 %
PLATELET # BLD AUTO: 166 K/UL
POTASSIUM SERPL-SCNC: 4.3 MMOL/L
PROT SERPL-MCNC: 6.3 G/DL
PT BLD: 10.5 SEC
RBC # BLD: 3.8 M/UL
RBC # FLD: 14.1 %
SODIUM SERPL-SCNC: 141 MMOL/L
T3FREE SERPL-MCNC: 3.02 PG/ML
T4 FREE SERPL-MCNC: 1.2 NG/DL
TSH SERPL-ACNC: 0.76 UIU/ML
VIT B12 SERPL-MCNC: 345 PG/ML
WBC # FLD AUTO: 6.36 K/UL

## 2025-07-01 PROCEDURE — 99214 OFFICE O/P EST MOD 30 MIN: CPT

## 2025-07-01 PROCEDURE — G0439: CPT

## 2025-07-02 LAB
B BURGDOR AB SER-IMP: POSITIVE
B BURGDOR IGG+IGM SER QL: 1.62 INDEX
T PALLIDUM AB SER QL IA: NEGATIVE

## 2025-07-07 PROBLEM — Z15.89 APOE*4/*4 GENOTYPE: Status: ACTIVE | Noted: 2025-07-07

## 2025-07-07 LAB
APOE INTERPRETATION: NORMAL
APOE REASON FOR REFERRAL: NORMAL
APOE RELEASED BY: NORMAL
APOE RESULT SUMMARY: NORMAL
APOE RESULT: NORMAL
APOE SPECIMEN: NORMAL
IMMUNOLOGIST REVIEW: NORMAL
METHYLMALONATE SERPL-SCNC: 177 NMOL/L
NFL CHAIN SERPL IA-MCNC: 25.8 PG/ML
P-TAU217 SERPL IA-MCNC: 0.57 PG/ML
VIT B1 SERPL-MCNC: 102.4 NMOL/L

## 2025-07-08 ENCOUNTER — NON-APPOINTMENT (OUTPATIENT)
Age: 75
End: 2025-07-08

## 2025-07-25 ENCOUNTER — OUTPATIENT (OUTPATIENT)
Dept: OUTPATIENT SERVICES | Facility: HOSPITAL | Age: 75
LOS: 1 days | End: 2025-07-25
Payer: MEDICARE

## 2025-07-25 ENCOUNTER — APPOINTMENT (OUTPATIENT)
Dept: MRI IMAGING | Facility: CLINIC | Age: 75
End: 2025-07-25
Payer: MEDICARE

## 2025-07-25 DIAGNOSIS — G31.84 MILD COGNITIVE IMPAIRMENT OF UNCERTAIN OR UNKNOWN ETIOLOGY: ICD-10-CM

## 2025-07-25 PROCEDURE — 70551 MRI BRAIN STEM W/O DYE: CPT | Mod: 26

## 2025-07-25 PROCEDURE — 70551 MRI BRAIN STEM W/O DYE: CPT

## 2025-07-25 PROCEDURE — 76377 3D RENDER W/INTRP POSTPROCES: CPT

## 2025-07-25 PROCEDURE — 76377 3D RENDER W/INTRP POSTPROCES: CPT | Mod: 26

## 2025-08-01 ENCOUNTER — TRANSCRIPTION ENCOUNTER (OUTPATIENT)
Age: 75
End: 2025-08-01

## 2025-08-11 ENCOUNTER — RX RENEWAL (OUTPATIENT)
Age: 75
End: 2025-08-11